# Patient Record
Sex: FEMALE | Race: WHITE | NOT HISPANIC OR LATINO | Employment: OTHER | URBAN - METROPOLITAN AREA
[De-identification: names, ages, dates, MRNs, and addresses within clinical notes are randomized per-mention and may not be internally consistent; named-entity substitution may affect disease eponyms.]

---

## 2017-01-30 RX ORDER — LOSARTAN POTASSIUM 50 MG/1
TABLET ORAL
Qty: 180 TAB | Refills: 1 | Status: SHIPPED | OUTPATIENT
Start: 2017-01-30 | End: 2017-02-28

## 2017-01-30 NOTE — TELEPHONE ENCOUNTER
Refill X 6 months, sent to pharmacy.Pt. Seen in the last 6 months per protocol.   Lab Results   Component Value Date/Time    SODIUM 136 03/31/2016 10:39 AM    POTASSIUM 4.2 03/31/2016 10:39 AM    CHLORIDE 101 03/31/2016 10:39 AM    CO2 27 03/31/2016 10:39 AM    GLUCOSE 99 03/31/2016 10:39 AM    BUN 14 03/31/2016 10:39 AM    CREATININE 0.81 03/31/2016 10:39 AM

## 2017-01-30 NOTE — TELEPHONE ENCOUNTER
Was the patient seen in the last year in this department? Yes     Does patient have an active prescription for medications requested? No     Received Request Via: Pharmacy      Pt met protocol?: Yes, LABS 3/16, OV PCP 2/16, OV OP 6/16 /92

## 2017-02-21 ENCOUNTER — APPOINTMENT (OUTPATIENT)
Dept: MEDICAL GROUP | Facility: PHYSICIAN GROUP | Age: 82
End: 2017-02-21
Payer: MEDICARE

## 2017-02-28 ENCOUNTER — OFFICE VISIT (OUTPATIENT)
Dept: MEDICAL GROUP | Facility: PHYSICIAN GROUP | Age: 82
End: 2017-02-28
Payer: MEDICARE

## 2017-02-28 VITALS
TEMPERATURE: 98.1 F | BODY MASS INDEX: 23.95 KG/M2 | OXYGEN SATURATION: 98 % | WEIGHT: 149 LBS | HEART RATE: 74 BPM | DIASTOLIC BLOOD PRESSURE: 80 MMHG | SYSTOLIC BLOOD PRESSURE: 132 MMHG | HEIGHT: 66 IN | RESPIRATION RATE: 16 BRPM

## 2017-02-28 DIAGNOSIS — E83.52 HYPERCALCEMIA: ICD-10-CM

## 2017-02-28 DIAGNOSIS — Z96.641 S/P HIP REPLACEMENT, RIGHT: ICD-10-CM

## 2017-02-28 DIAGNOSIS — Z23 NEED FOR TDAP VACCINATION: ICD-10-CM

## 2017-02-28 DIAGNOSIS — Z00.00 MEDICARE ANNUAL WELLNESS VISIT, SUBSEQUENT: Primary | ICD-10-CM

## 2017-02-28 DIAGNOSIS — K21.9 GASTROESOPHAGEAL REFLUX DISEASE WITHOUT ESOPHAGITIS: ICD-10-CM

## 2017-02-28 DIAGNOSIS — Z12.39 SCREENING FOR BREAST CANCER: ICD-10-CM

## 2017-02-28 DIAGNOSIS — J30.1 SEASONAL ALLERGIC RHINITIS DUE TO POLLEN: ICD-10-CM

## 2017-02-28 DIAGNOSIS — M19.032 PRIMARY OSTEOARTHRITIS OF LEFT WRIST: ICD-10-CM

## 2017-02-28 DIAGNOSIS — E21.3 HYPERPARATHYROIDISM (HCC): ICD-10-CM

## 2017-02-28 DIAGNOSIS — M50.30 DDD (DEGENERATIVE DISC DISEASE), CERVICAL: ICD-10-CM

## 2017-02-28 DIAGNOSIS — E55.9 VITAMIN D DEFICIENCY: ICD-10-CM

## 2017-02-28 DIAGNOSIS — I10 ESSENTIAL HYPERTENSION: Chronic | ICD-10-CM

## 2017-02-28 DIAGNOSIS — E78.2 MIXED HYPERLIPIDEMIA: ICD-10-CM

## 2017-02-28 DIAGNOSIS — L98.9 CHANGING SKIN LESION: ICD-10-CM

## 2017-02-28 PROCEDURE — G0439 PPPS, SUBSEQ VISIT: HCPCS | Mod: 25 | Performed by: NURSE PRACTITIONER

## 2017-02-28 PROCEDURE — 90715 TDAP VACCINE 7 YRS/> IM: CPT | Performed by: NURSE PRACTITIONER

## 2017-02-28 PROCEDURE — G8510 SCR DEP NEG, NO PLAN REQD: HCPCS | Performed by: NURSE PRACTITIONER

## 2017-02-28 PROCEDURE — 1036F TOBACCO NON-USER: CPT | Performed by: NURSE PRACTITIONER

## 2017-02-28 PROCEDURE — 90471 IMMUNIZATION ADMIN: CPT | Performed by: NURSE PRACTITIONER

## 2017-02-28 PROCEDURE — 99999 PR ANNUAL WELLNESS VISIT-INCLUDES PPPS SUBSEQUE*: CPT | Performed by: NURSE PRACTITIONER

## 2017-02-28 RX ORDER — HYDROCHLOROTHIAZIDE 12.5 MG/1
12.5 CAPSULE, GELATIN COATED ORAL DAILY
Qty: 90 CAP | Refills: 3 | Status: SHIPPED | OUTPATIENT
Start: 2017-02-28 | End: 2018-03-05 | Stop reason: SDUPTHER

## 2017-02-28 RX ORDER — HYDROCHLOROTHIAZIDE 12.5 MG/1
12.5 CAPSULE, GELATIN COATED ORAL DAILY
COMMUNITY
End: 2017-02-28 | Stop reason: SDUPTHER

## 2017-02-28 RX ORDER — LOSARTAN POTASSIUM 100 MG/1
100 TABLET ORAL 2 TIMES DAILY
Qty: 180 TAB | Refills: 3 | Status: SHIPPED | OUTPATIENT
Start: 2017-02-28 | End: 2017-09-15

## 2017-02-28 RX ORDER — ATORVASTATIN CALCIUM 40 MG/1
40 TABLET, FILM COATED ORAL NIGHTLY
COMMUNITY
End: 2017-09-15

## 2017-02-28 ASSESSMENT — PAIN SCALES - GENERAL: PAINLEVEL: NO PAIN

## 2017-02-28 ASSESSMENT — PATIENT HEALTH QUESTIONNAIRE - PHQ9: CLINICAL INTERPRETATION OF PHQ2 SCORE: 0

## 2017-02-28 NOTE — MR AVS SNAPSHOT
"        Zainab Caruso Eri   2017 11:00 AM   Office Visit   MRN: [id number]    Department:  [facility]   Dept Phone:  [phone]    Description:  Female : 7/15/1934   Provider:  JOSEPH Hussein; [facility]            Reason for Visit     Annual Wellness Visit           Allergies as of 2017     Allergen Noted Reactions    Ace Inhibitors 10/19/2015       Statins [Hmg-Coa-R Inhibitors] 2014   Nausea      You were diagnosed with     Medicare annual wellness visit, subsequent   [796345]  -  Primary     Essential hypertension   [8184958]       Hypercalcemia   [275.42.ICD-9-CM]       Hyperparathyroidism (CMS-HCC)   [0251609]       Vitamin D deficiency   [8456674]       Mixed hyperlipidemia   [272.2.ICD-9-CM]       DDD (degenerative disc disease), cervical   [558356]       Primary osteoarthritis of left wrist   [583908]       Seasonal allergic rhinitis due to pollen   [9691445]       Changing skin lesion   [962427]       Gastroesophageal reflux disease without esophagitis   [958903]       S/P hip replacement, right   [2075149]       Need for Tdap vaccination   [929102]       Screening for breast cancer   [366541]         Vital Signs     Blood Pressure Pulse Temperature Respirations Height Weight    132/80 mmHg 74 36.7 °C (98.1 °F) 16 1.676 m (5' 5.98\") 67.586 kg (149 lb)    Body Mass Index Oxygen Saturation Smoking Status             24.06 kg/m2 98% Former Smoker         Basic Information     Date Of Birth Sex Race Ethnicity Preferred Language    7/15/1934 Female White Non- English      Problem List              ICD-10-CM Priority Class Noted - Resolved    Hypertension (Chronic) I10   2014 - Present    Allergic rhinitis J30.9   2014 - Present    GERD (gastroesophageal reflux disease) K21.9   2014 - Present    S/P hip replacement Z96.649   2014 - Present    DDD (degenerative disc disease), cervical M50.30   2014 - Present    Hypercalcemia E83.52  "  1/19/2015 - Present    Hyperparathyroidism (CMS-HCC) E21.3   1/19/2015 - Present    Vitamin D deficiency E55.9   1/19/2015 - Present    Primary osteoarthritis of left wrist M19.032   10/19/2015 - Present      Health Maintenance        Date Due Completion Dates    IMM DTaP/Tdap/Td Vaccine (1 - Tdap) 7/15/1953 ---    MAMMOGRAM 2/11/2017 2/11/2016, 9/9/2009 (Prv Comp)    Override on 9/9/2009: Previously completed    COLONOSCOPY 9/9/2019 9/9/2009 (Prv Comp)    Override on 9/9/2009: Previously completed    BONE DENSITY 3/17/2020 3/17/2015, 9/9/2011 (Prv Comp)    Override on 9/9/2011: Previously completed            Current Immunizations     13-VALENT PCV PREVNAR 10/19/2015    Influenza Vaccine Adult HD 11/20/2016, 10/19/2015    Pneumococcal polysaccharide vaccine (PPSV-23) 10/19/2006    SHINGLES VACCINE 10/1/2015    Tdap Vaccine 2/28/2017      Below and/or attached are the medications your provider expects you to take. Review all of your home medications and newly ordered medications with your provider and/or pharmacist. Follow medication instructions as directed by your provider and/or pharmacist. Please keep your medication list with you and share with your provider. Update the information when medications are discontinued, doses are changed, or new medications (including over-the-counter products) are added; and carry medication information at all times in the event of emergency situations     Allergies:  ACE INHIBITORS - (reactions not documented)     STATINS - Nausea               Medications  Valid as of: February 28, 2017 - 11:59 AM    Generic Name Brand Name Tablet Size Instructions for use    Atorvastatin Calcium (Tab) LIPITOR 40 MG Take 40 mg by mouth every evening.        Cholecalciferol (Tab) cholecalciferol 1000 UNIT Take 1,000 Units by mouth 2 Times a Day.        HydroCHLOROthiazide (Cap) MICROZIDE 12.5 MG Take 1 Cap by mouth every day.        Losartan Potassium (Tab) COZAAR 100 MG Take 1 Tab by mouth 2  Times a Day.        Multiple Vitamin   Take  by mouth.        Omega-3 Fatty Acids (Cap) fish oil 1000 MG Take 1,000 mg by mouth 3 times a day, with meals.        .                 Medicines prescribed today were sent to:     [facility][id number] - [place], [place] - [address]    [address] [facility] [place] [address]    Phone: [phone] Fax: [phone]    Open 24 Hours?: No      Medication refill instructions:       If your prescription bottle indicates you have medication refills left, it is not necessary to call your provider’s office. Please contact your pharmacy and they will refill your medication.    If your prescription bottle indicates you do not have any refills left, you may request refills at any time through one of the following ways: The online [employer] system (except Urgent Care), by calling your provider’s office, or by asking your pharmacy to contact your provider’s office with a refill request. Medication refills are processed only during regular business hours and may not be available until the next business day. Your provider may request additional information or to have a follow-up visit with you prior to refilling your medication.   *Please Note: Medication refills are assigned a new Rx number when refilled electronically. Your pharmacy may indicate that no refills were authorized even though a new prescription for the same medication is available at the pharmacy. Please request the medicine by name with the pharmacy before contacting your provider for a refill.        Your To Do List     Future Labs/Procedures Complete By Expires    COMP METABOLIC PANEL  As directed 2/28/2018    LIPID PROFILE  As directed 2/28/2018    MA-SCREEN MAMMO W/CAD-BILAT  As directed 3/31/2018    VITAMIN D,25 HYDROXY  As directed 2/28/2018      Referral     A referral request has been sent to our patient care coordination department. Please allow 3-5 business days for us to process this request and contact you either by  phone or mail. If you do not hear from us by the 5th business day, please call us at [phone].           MyChart Status: Patient Declined

## 2017-02-28 NOTE — PROGRESS NOTES
Chief Complaint   Patient presents with   • Annual Wellness Visit       HPI:  Zainab is a 82 y.o. female here for Medicare Annual Wellness Visit      Patient Active Problem List    Diagnosis Date Noted   • Primary osteoarthritis of left wrist 10/19/2015   • Hypercalcemia 01/19/2015   • Hyperparathyroidism (CMS-HCC) 01/19/2015   • Vitamin D deficiency 01/19/2015   • DDD (degenerative disc disease), cervical 12/02/2014   • Hypertension 09/09/2014   • Allergic rhinitis 09/09/2014   • GERD (gastroesophageal reflux disease) 09/09/2014   • S/P hip replacement 09/09/2014       Current Outpatient Prescriptions   Medication Sig Dispense Refill   • atorvastatin (LIPITOR) 40 MG Tab Take 40 mg by mouth every evening.     • hydrochlorothiazide (MICROZIDE) 12.5 MG capsule Take 12.5 mg by mouth every day.     • losartan (COZAAR) 50 MG Tab TAKE ONE TABLET BY MOUTH TWICE DAILY 180 Tab 1   • Multiple Vitamin (MULTI-VITAMINS PO) Take  by mouth.     • Omega-3 Fatty Acids (FISH OIL) 1000 MG Cap capsule Take 1,000 mg by mouth 3 times a day, with meals.     • cefdinir (OMNICEF) 300 MG Cap Take 1 Cap by mouth 2 times a day. (Patient not taking: Reported on 2/28/2017) 20 Cap 0   • tramadol (ULTRAM) 50 MG Tab Take 1-2 Tabs by mouth every 8 hours as needed for Mild Pain. 60 Tab 0   • vitamin D (CHOLECALCIFEROL) 1000 UNIT TABS Take 1,000 Units by mouth 2 Times a Day.       No current facility-administered medications for this visit.        The patient reports adherence to this regimen   Current supplements as per medication list.   Chronic narcotic pain medicines: no    Allergies: Ace inhibitors and Statins    Current social contact/activities: out to meals with family, travel to see family in [place]     Is patient current with immunizations?  no   If no, due for Tdap    She  reports that she has quit smoking. She has never used smokeless tobacco. She reports that she drinks about 1.5 oz of alcohol per week. She reports that she does not  use illicit drugs.  Counseling given: Yes      DPA/Advanced Directive:  Patient do not have an advanced directive.  If not on file, instructed to bring in a copy to scan into her chart. If no advanced directive exists, a packet and workshop information was provided    ROS:    Gait: Uses no assistive device   Ostomy: no   Other tubes: no   Amputations: no   Chronic oxygen use no   Last eye exam 2013   :  incontinence.     Depression Screening    Little interest or pleasure in doing things?  0 - not at all  Feeling down, depressed, or hopeless?  0 - not at all  Patient Health Questionnaire Score: 0    No depressive symptoms identified.    Screening for Cognitive Impairment    Three Minute Recall (banana, sunrise, fence)  3/3    Draw clock face with all 12 numbers set to the hand to show 10 minutes past 11 o'clock  5/5    No cognitive concerns identified.    Fall Risk Assessment    Has the patient had two or more falls in the last year or any fall with injury in the last year?  No    Safety Assessment    Throw rugs on floor.  Yes  Handrails on all stairs.  Yes  Good lighting in all hallways.  Yes  Difficulty hearing.  No  Patient counseled about all safety risks that were identified.    Functional Assessment ADLs    Are there any barriers preventing you from cooking for yourself or meeting nutritional needs?  No.    Are there any barriers preventing you from driving safely or obtaining transportation?  No.    Are there any barriers preventing you from using a telephone or calling for help?  No.    Are there any barriers preventing you from shopping?  No.    Are there any barriers preventing you from taking care of your own finances?  No.    Are there any barriers preventing you from managing your medications?  No.    Are currently engaging any exercise or physical activity?   Yes.  Member at [employer]    Health Maintenance Summary                IMM DTaP/Tdap/Td Vaccine Overdue 7/15/1953     MAMMOGRAM Overdue  "2/11/2017      Done 2/11/2016 MA-SCREEN MAMMO W/CAD-BILAT     Patient has more history with this topic...    Annual Wellness Visit Overdue 2/16/2017      Done 2/16/2016 Visit Dx: Medicare annual wellness visit, subsequent    COLONOSCOPY Next Due 9/9/2019      Previously completed 9/9/2009     BONE DENSITY Next Due 3/17/2020      Done 3/17/2015 DS-BONE DENSITY STUDY (DEXA)     Patient has more history with this topic...          Patient Care Team:  JOSEPH Hussein as PCP - General (Family Medicine)  Rayshawn Benson M.D. as Consulting Physician (Endocrinology)    Social History   Substance Use Topics   • Smoking status: Former Smoker   • Smokeless tobacco: Never Used      Comment: quit 30 years ago   • Alcohol Use: 1.5 oz/week     3 Glasses of wine per week     Family History   Problem Relation Age of Onset   • Stroke Mother    • Cancer Father      prostate     She  has a past medical history of Hypertension (9/9/2014); Allergic rhinitis (9/9/2014); GERD (gastroesophageal reflux disease) (9/9/2014); S/P subtotal gastrectomy (remote); S/P cholecystectomy (remote); and Hip fracture (CMS-HCC) (2013).   Past Surgical History   Procedure Laterality Date   • Other       Endoscopy /gastric resection and Vagotomy more than 30 yr ago   • Other       basal cell   • Cholecystectomy     • Other       rib   • Athroplasty       Right hip replacement   • Tubal coagulation laparoscopic bilateral         Exam:     Blood pressure 132/80, pulse 74, temperature 36.7 °C (98.1 °F), resp. rate 16, height 1.676 m (5' 5.98\"), weight 67.586 kg (149 lb), SpO2 98 %. Body mass index is 24.06 kg/(m^2).    Hearing good.    Dentition good  Alert, oriented in no acute distress.  Eye contact is good, speech goal directed, affect calm    Assessment and Plan. The following treatment and monitoring plan is recommended:      1. Medicare annual wellness visit, subsequent  Stable with current regimen.  Appropriate medications and lab monitored " routinely.    - atorvastatin (LIPITOR) 40 MG Tab; Take 40 mg by mouth every evening.  - TDAP VACCINE =>8YO IM  - MA-SCREEN MAMMO W/CAD-BILAT; Standing  - MA-SCREEN MAMMO W/CAD-BILAT; Future  - MA-SCREEN MAMMO W/CAD-BILAT  - LIPID PROFILE; Future  - COMP METABOLIC PANEL; Future  - VITAMIN D,25 HYDROXY; Future  - PTH INTACT  - REFERRAL TO DERMATOLOGY  - losartan (COZAAR) 100 MG Tab; Take 1 Tab by mouth 2 Times a Day.  Dispense: 180 Tab; Refill: 3  - hydrochlorothiazide (MICROZIDE) 12.5 MG capsule; Take 1 Cap by mouth every day.  Dispense: 90 Cap; Refill: 3  - Annual Wellness Visit - Includes PPPS Subsequent ()    2. Essential hypertension  Stable with current regimen.  Appropriate medications and lab monitored routinely.    - atorvastatin (LIPITOR) 40 MG Tab; Take 40 mg by mouth every evening.  - TDAP VACCINE =>8YO IM  - MA-SCREEN MAMMO W/CAD-BILAT; Standing  - MA-SCREEN MAMMO W/CAD-BILAT; Future  - MA-SCREEN MAMMO W/CAD-BILAT  - LIPID PROFILE; Future  - COMP METABOLIC PANEL; Future  - VITAMIN D,25 HYDROXY; Future  - PTH INTACT  - REFERRAL TO DERMATOLOGY  - losartan (COZAAR) 100 MG Tab; Take 1 Tab by mouth 2 Times a Day.  Dispense: 180 Tab; Refill: 3  - hydrochlorothiazide (MICROZIDE) 12.5 MG capsule; Take 1 Cap by mouth every day.  Dispense: 90 Cap; Refill: 3  - Annual Wellness Visit - Includes PPPS Subsequent ()    3. Hypercalcemia  Stable with current regimen.  Appropriate medications and lab monitored routinely.  Followed by endocrinology    - atorvastatin (LIPITOR) 40 MG Tab; Take 40 mg by mouth every evening.  - TDAP VACCINE =>8YO IM  - MA-SCREEN MAMMO W/CAD-BILAT; Standing  - MA-SCREEN MAMMO W/CAD-BILAT; Future  - MA-SCREEN MAMMO W/CAD-BILAT  - LIPID PROFILE; Future  - COMP METABOLIC PANEL; Future  - VITAMIN D,25 HYDROXY; Future  - PTH INTACT  - REFERRAL TO DERMATOLOGY  - losartan (COZAAR) 100 MG Tab; Take 1 Tab by mouth 2 Times a Day.  Dispense: 180 Tab; Refill: 3  - hydrochlorothiazide (MICROZIDE)  12.5 MG capsule; Take 1 Cap by mouth every day.  Dispense: 90 Cap; Refill: 3  - Annual Wellness Visit - Includes PPPS Subsequent ()    4. Hyperparathyroidism (CMS-HCC)  Stable with current regimen.  Appropriate medications and lab monitored routinely.  Followed by endocrinology    - atorvastatin (LIPITOR) 40 MG Tab; Take 40 mg by mouth every evening.  - TDAP VACCINE =>8YO IM  - MA-SCREEN MAMMO W/CAD-BILAT; Standing  - MA-SCREEN MAMMO W/CAD-BILAT; Future  - MA-SCREEN MAMMO W/CAD-BILAT  - LIPID PROFILE; Future  - COMP METABOLIC PANEL; Future  - VITAMIN D,25 HYDROXY; Future  - PTH INTACT  - REFERRAL TO DERMATOLOGY  - losartan (COZAAR) 100 MG Tab; Take 1 Tab by mouth 2 Times a Day.  Dispense: 180 Tab; Refill: 3  - hydrochlorothiazide (MICROZIDE) 12.5 MG capsule; Take 1 Cap by mouth every day.  Dispense: 90 Cap; Refill: 3  - Annual Wellness Visit - Includes PPPS Subsequent ()    5. Vitamin D deficiency  Stable with current regimen.  Appropriate medications and lab monitored routinely.    - atorvastatin (LIPITOR) 40 MG Tab; Take 40 mg by mouth every evening.  - TDAP VACCINE =>8YO IM  - MA-SCREEN MAMMO W/CAD-BILAT; Standing  - MA-SCREEN MAMMO W/CAD-BILAT; Future  - MA-SCREEN MAMMO W/CAD-BILAT  - LIPID PROFILE; Future  - COMP METABOLIC PANEL; Future  - VITAMIN D,25 HYDROXY; Future  - PTH INTACT  - REFERRAL TO DERMATOLOGY  - losartan (COZAAR) 100 MG Tab; Take 1 Tab by mouth 2 Times a Day.  Dispense: 180 Tab; Refill: 3  - hydrochlorothiazide (MICROZIDE) 12.5 MG capsule; Take 1 Cap by mouth every day.  Dispense: 90 Cap; Refill: 3  - Annual Wellness Visit - Includes PPPS Subsequent ()    6. Mixed hyperlipidemia  Stable with current regimen.  Appropriate medications and lab monitored routinely.  Due for repeat fasting labs    - atorvastatin (LIPITOR) 40 MG Tab; Take 40 mg by mouth every evening.  - TDAP VACCINE =>8YO IM  - MA-SCREEN MAMMO W/CAD-BILAT; Standing  - MA-SCREEN MAMMO W/CAD-BILAT; Future  - MA-SCREEN  MAMMO W/CAD-BILAT  - LIPID PROFILE; Future  - COMP METABOLIC PANEL; Future  - VITAMIN D,25 HYDROXY; Future  - PTH INTACT  - REFERRAL TO DERMATOLOGY  - losartan (COZAAR) 100 MG Tab; Take 1 Tab by mouth 2 Times a Day.  Dispense: 180 Tab; Refill: 3  - hydrochlorothiazide (MICROZIDE) 12.5 MG capsule; Take 1 Cap by mouth every day.  Dispense: 90 Cap; Refill: 3  - Annual Wellness Visit - Includes PPPS Subsequent ()    7. DDD (degenerative disc disease), cervical  Stable with current regimen.  Appropriate medications and lab monitored routinely.    - atorvastatin (LIPITOR) 40 MG Tab; Take 40 mg by mouth every evening.  - TDAP VACCINE =>8YO IM  - MA-SCREEN MAMMO W/CAD-BILAT; Standing  - MA-SCREEN MAMMO W/CAD-BILAT; Future  - MA-SCREEN MAMMO W/CAD-BILAT  - LIPID PROFILE; Future  - COMP METABOLIC PANEL; Future  - VITAMIN D,25 HYDROXY; Future  - PTH INTACT  - REFERRAL TO DERMATOLOGY  - losartan (COZAAR) 100 MG Tab; Take 1 Tab by mouth 2 Times a Day.  Dispense: 180 Tab; Refill: 3  - hydrochlorothiazide (MICROZIDE) 12.5 MG capsule; Take 1 Cap by mouth every day.  Dispense: 90 Cap; Refill: 3  - Annual Wellness Visit - Includes PPPS Subsequent ()    8. Primary osteoarthritis of left wrist  Stable with current regimen.  Appropriate medications and lab monitored routinely.    - atorvastatin (LIPITOR) 40 MG Tab; Take 40 mg by mouth every evening.  - TDAP VACCINE =>8YO IM  - MA-SCREEN MAMMO W/CAD-BILAT; Standing  - MA-SCREEN MAMMO W/CAD-BILAT; Future  - MA-SCREEN MAMMO W/CAD-BILAT  - LIPID PROFILE; Future  - COMP METABOLIC PANEL; Future  - VITAMIN D,25 HYDROXY; Future  - PTH INTACT  - REFERRAL TO DERMATOLOGY  - losartan (COZAAR) 100 MG Tab; Take 1 Tab by mouth 2 Times a Day.  Dispense: 180 Tab; Refill: 3  - hydrochlorothiazide (MICROZIDE) 12.5 MG capsule; Take 1 Cap by mouth every day.  Dispense: 90 Cap; Refill: 3  - Annual Wellness Visit - Includes PPPS Subsequent ()    9. Seasonal allergic rhinitis due to  pollen  Stable with current regimen.  Appropriate medications and lab monitored routinely.    - atorvastatin (LIPITOR) 40 MG Tab; Take 40 mg by mouth every evening.  - TDAP VACCINE =>6YO IM  - MA-SCREEN MAMMO W/CAD-BILAT; Standing  - MA-SCREEN MAMMO W/CAD-BILAT; Future  - MA-SCREEN MAMMO W/CAD-BILAT  - LIPID PROFILE; Future  - COMP METABOLIC PANEL; Future  - VITAMIN D,25 HYDROXY; Future  - PTH INTACT  - REFERRAL TO DERMATOLOGY  - losartan (COZAAR) 100 MG Tab; Take 1 Tab by mouth 2 Times a Day.  Dispense: 180 Tab; Refill: 3  - hydrochlorothiazide (MICROZIDE) 12.5 MG capsule; Take 1 Cap by mouth every day.  Dispense: 90 Cap; Refill: 3  - Annual Wellness Visit - Includes PPPS Subsequent ()    10. Changing skin lesion  Personal history of squamous cell  Referral to derm    - atorvastatin (LIPITOR) 40 MG Tab; Take 40 mg by mouth every evening.  - TDAP VACCINE =>6YO IM  - MA-SCREEN MAMMO W/CAD-BILAT; Standing  - MA-SCREEN MAMMO W/CAD-BILAT; Future  - MA-SCREEN MAMMO W/CAD-BILAT  - LIPID PROFILE; Future  - COMP METABOLIC PANEL; Future  - VITAMIN D,25 HYDROXY; Future  - PTH INTACT  - REFERRAL TO DERMATOLOGY  - losartan (COZAAR) 100 MG Tab; Take 1 Tab by mouth 2 Times a Day.  Dispense: 180 Tab; Refill: 3  - hydrochlorothiazide (MICROZIDE) 12.5 MG capsule; Take 1 Cap by mouth every day.  Dispense: 90 Cap; Refill: 3  - Annual Wellness Visit - Includes PPPS Subsequent ()    11. Gastroesophageal reflux disease without esophagitis  Stable with current regimen.  Appropriate medications and lab monitored routinely.    - atorvastatin (LIPITOR) 40 MG Tab; Take 40 mg by mouth every evening.  - TDAP VACCINE =>6YO IM  - MA-SCREEN MAMMO W/CAD-BILAT; Standing  - MA-SCREEN MAMMO W/CAD-BILAT; Future  - MA-SCREEN MAMMO W/CAD-BILAT  - LIPID PROFILE; Future  - COMP METABOLIC PANEL; Future  - VITAMIN D,25 HYDROXY; Future  - PTH INTACT  - REFERRAL TO DERMATOLOGY  - losartan (COZAAR) 100 MG Tab; Take 1 Tab by mouth 2 Times a Day.   Dispense: 180 Tab; Refill: 3  - hydrochlorothiazide (MICROZIDE) 12.5 MG capsule; Take 1 Cap by mouth every day.  Dispense: 90 Cap; Refill: 3  - Annual Wellness Visit - Includes PPPS Subsequent ()    12. S/P hip replacement, right  Stable with current regimen.  Appropriate medications and lab monitored routinely.    - atorvastatin (LIPITOR) 40 MG Tab; Take 40 mg by mouth every evening.  - TDAP VACCINE =>6YO IM  - MA-SCREEN MAMMO W/CAD-BILAT; Standing  - MA-SCREEN MAMMO W/CAD-BILAT; Future  - MA-SCREEN MAMMO W/CAD-BILAT  - LIPID PROFILE; Future  - COMP METABOLIC PANEL; Future  - VITAMIN D,25 HYDROXY; Future  - PTH INTACT  - REFERRAL TO DERMATOLOGY  - losartan (COZAAR) 100 MG Tab; Take 1 Tab by mouth 2 Times a Day.  Dispense: 180 Tab; Refill: 3  - hydrochlorothiazide (MICROZIDE) 12.5 MG capsule; Take 1 Cap by mouth every day.  Dispense: 90 Cap; Refill: 3  - Annual Wellness Visit - Includes PPPS Subsequent ()    13. Need for Tdap vaccination  I have placed the below orders and discussed them with an approved delegating provider. The MA is performing the below orders under the direction of Dr. Monroy, who have provided verbal consent for supervision.    - atorvastatin (LIPITOR) 40 MG Tab; Take 40 mg by mouth every evening.  - TDAP VACCINE =>6YO IM  - MA-SCREEN MAMMO W/CAD-BILAT; Standing  - MA-SCREEN MAMMO W/CAD-BILAT; Future  - MA-SCREEN MAMMO W/CAD-BILAT  - LIPID PROFILE; Future  - COMP METABOLIC PANEL; Future  - VITAMIN D,25 HYDROXY; Future  - PTH INTACT  - REFERRAL TO DERMATOLOGY  - losartan (COZAAR) 100 MG Tab; Take 1 Tab by mouth 2 Times a Day.  Dispense: 180 Tab; Refill: 3  - hydrochlorothiazide (MICROZIDE) 12.5 MG capsule; Take 1 Cap by mouth every day.  Dispense: 90 Cap; Refill: 3  - Annual Wellness Visit - Includes PPPS Subsequent ()    14. Screening for breast cancer  Order given    - atorvastatin (LIPITOR) 40 MG Tab; Take 40 mg by mouth every evening.  - TDAP VACCINE =>6YO IM  - MA-SCREEN MAMMO  W/CAD-BILAT; Standing  - MA-SCREEN MAMMO W/CAD-BILAT; Future  - MA-SCREEN MAMMO W/CAD-BILAT  - LIPID PROFILE; Future  - COMP METABOLIC PANEL; Future  - VITAMIN D,25 HYDROXY; Future  - PTH INTACT  - REFERRAL TO DERMATOLOGY  - losartan (COZAAR) 100 MG Tab; Take 1 Tab by mouth 2 Times a Day.  Dispense: 180 Tab; Refill: 3  - hydrochlorothiazide (MICROZIDE) 12.5 MG capsule; Take 1 Cap by mouth every day.  Dispense: 90 Cap; Refill: 3  - Annual Wellness Visit - Includes PPPS Subsequent ()    Services needed: No services needed at this time  Health Care Screening recommendations as per orders if indicated.  Referrals offered: PT/OT/Nutrition counseling/Behavioral Health/Smoking cessation as per orders if indicated.    Discussion today about general wellness and lifestyle habits:    · Prevent falls and reduce trip hazards; Cautioned about securing or removing rugs.  · Have a working fire alarm and carbon monoxide detector;   · Engage in regular physical activity and social activities       Follow-up:  RTC yearly, sooner if labs warrant discussion.

## 2017-04-12 ENCOUNTER — HOSPITAL ENCOUNTER (OUTPATIENT)
Dept: LAB | Facility: MEDICAL CENTER | Age: 82
End: 2017-04-12
Attending: NURSE PRACTITIONER
Payer: MEDICARE

## 2017-04-12 DIAGNOSIS — Z96.641 S/P HIP REPLACEMENT, RIGHT: ICD-10-CM

## 2017-04-12 DIAGNOSIS — K21.9 GASTROESOPHAGEAL REFLUX DISEASE WITHOUT ESOPHAGITIS: ICD-10-CM

## 2017-04-12 DIAGNOSIS — I10 ESSENTIAL HYPERTENSION: Chronic | ICD-10-CM

## 2017-04-12 DIAGNOSIS — L98.9 CHANGING SKIN LESION: ICD-10-CM

## 2017-04-12 DIAGNOSIS — E55.9 VITAMIN D DEFICIENCY: ICD-10-CM

## 2017-04-12 DIAGNOSIS — M19.032 PRIMARY OSTEOARTHRITIS OF LEFT WRIST: ICD-10-CM

## 2017-04-12 DIAGNOSIS — Z23 NEED FOR TDAP VACCINATION: ICD-10-CM

## 2017-04-12 DIAGNOSIS — E78.2 MIXED HYPERLIPIDEMIA: ICD-10-CM

## 2017-04-12 DIAGNOSIS — J30.1 SEASONAL ALLERGIC RHINITIS DUE TO POLLEN: ICD-10-CM

## 2017-04-12 DIAGNOSIS — Z12.39 SCREENING FOR BREAST CANCER: ICD-10-CM

## 2017-04-12 DIAGNOSIS — Z00.00 MEDICARE ANNUAL WELLNESS VISIT, SUBSEQUENT: ICD-10-CM

## 2017-04-12 DIAGNOSIS — E83.52 HYPERCALCEMIA: ICD-10-CM

## 2017-04-12 DIAGNOSIS — M50.30 DDD (DEGENERATIVE DISC DISEASE), CERVICAL: ICD-10-CM

## 2017-04-12 DIAGNOSIS — E21.3 HYPERPARATHYROIDISM (HCC): ICD-10-CM

## 2017-04-12 LAB
25(OH)D3 SERPL-MCNC: 37 NG/ML (ref 30–100)
ALBUMIN SERPL BCP-MCNC: 4.4 G/DL (ref 3.2–4.9)
ALBUMIN/GLOB SERPL: 1.6 G/DL
ALP SERPL-CCNC: 58 U/L (ref 30–99)
ALT SERPL-CCNC: 8 U/L (ref 2–50)
ANION GAP SERPL CALC-SCNC: 6 MMOL/L (ref 0–11.9)
AST SERPL-CCNC: 13 U/L (ref 12–45)
BILIRUB SERPL-MCNC: 0.7 MG/DL (ref 0.1–1.5)
BUN SERPL-MCNC: 11 MG/DL (ref 8–22)
CALCIUM SERPL-MCNC: 10.3 MG/DL (ref 8.5–10.5)
CHLORIDE SERPL-SCNC: 95 MMOL/L (ref 96–112)
CHOLEST SERPL-MCNC: 202 MG/DL (ref 100–199)
CO2 SERPL-SCNC: 29 MMOL/L (ref 20–33)
CREAT SERPL-MCNC: 0.89 MG/DL (ref 0.5–1.4)
GFR SERPL CREATININE-BSD FRML MDRD: >60 ML/MIN/1.73 M 2
GLOBULIN SER CALC-MCNC: 2.7 G/DL (ref 1.9–3.5)
GLUCOSE SERPL-MCNC: 92 MG/DL (ref 65–99)
HDLC SERPL-MCNC: 94 MG/DL
LDLC SERPL CALC-MCNC: 95 MG/DL
POTASSIUM SERPL-SCNC: 4 MMOL/L (ref 3.6–5.5)
PROT SERPL-MCNC: 7.1 G/DL (ref 6–8.2)
PTH-INTACT SERPL-MCNC: 62.6 PG/ML (ref 14–72)
SODIUM SERPL-SCNC: 130 MMOL/L (ref 135–145)
TRIGL SERPL-MCNC: 64 MG/DL (ref 0–149)

## 2017-04-12 PROCEDURE — 80053 COMPREHEN METABOLIC PANEL: CPT

## 2017-04-12 PROCEDURE — 80061 LIPID PANEL: CPT

## 2017-04-12 PROCEDURE — 36415 COLL VENOUS BLD VENIPUNCTURE: CPT

## 2017-04-12 PROCEDURE — 83970 ASSAY OF PARATHORMONE: CPT

## 2017-04-12 PROCEDURE — 82306 VITAMIN D 25 HYDROXY: CPT

## 2017-04-19 ENCOUNTER — TELEPHONE (OUTPATIENT)
Dept: MEDICAL GROUP | Facility: PHYSICIAN GROUP | Age: 82
End: 2017-04-19

## 2017-04-19 DIAGNOSIS — E87.1 HYPONATREMIA: ICD-10-CM

## 2017-04-19 NOTE — TELEPHONE ENCOUNTER
----- Message from JOSEPH Hussein sent at 4/19/2017  1:37 PM PDT -----  Labs look good with the exception of low electrolytes (sodium and chloride).  I would like to repeat just that portion of the labs in 1-2 weeks. No fasting needed.  Thank you

## 2017-05-05 ENCOUNTER — HOSPITAL ENCOUNTER (OUTPATIENT)
Dept: LAB | Facility: MEDICAL CENTER | Age: 82
End: 2017-05-05
Attending: NURSE PRACTITIONER
Payer: MEDICARE

## 2017-05-05 DIAGNOSIS — E87.1 HYPONATREMIA: ICD-10-CM

## 2017-05-05 LAB
ANION GAP SERPL CALC-SCNC: 11 MMOL/L (ref 0–11.9)
BUN SERPL-MCNC: 14 MG/DL (ref 8–22)
CALCIUM SERPL-MCNC: 10.2 MG/DL (ref 8.5–10.5)
CHLORIDE SERPL-SCNC: 98 MMOL/L (ref 96–112)
CO2 SERPL-SCNC: 26 MMOL/L (ref 20–33)
CREAT SERPL-MCNC: 0.87 MG/DL (ref 0.5–1.4)
GFR SERPL CREATININE-BSD FRML MDRD: >60 ML/MIN/1.73 M 2
GLUCOSE SERPL-MCNC: 105 MG/DL (ref 65–99)
POTASSIUM SERPL-SCNC: 3.9 MMOL/L (ref 3.6–5.5)
SODIUM SERPL-SCNC: 135 MMOL/L (ref 135–145)

## 2017-05-05 PROCEDURE — 80048 BASIC METABOLIC PNL TOTAL CA: CPT

## 2017-05-05 PROCEDURE — 36415 COLL VENOUS BLD VENIPUNCTURE: CPT

## 2017-05-06 ENCOUNTER — HOSPITAL ENCOUNTER (OUTPATIENT)
Dept: RADIOLOGY | Facility: MEDICAL CENTER | Age: 82
End: 2017-05-06
Attending: NURSE PRACTITIONER
Payer: MEDICARE

## 2017-05-06 DIAGNOSIS — I10 ESSENTIAL HYPERTENSION: Chronic | ICD-10-CM

## 2017-05-06 DIAGNOSIS — E55.9 VITAMIN D DEFICIENCY: ICD-10-CM

## 2017-05-06 DIAGNOSIS — E21.3 HYPERPARATHYROIDISM (HCC): ICD-10-CM

## 2017-05-06 DIAGNOSIS — Z00.00 MEDICARE ANNUAL WELLNESS VISIT, SUBSEQUENT: ICD-10-CM

## 2017-05-06 DIAGNOSIS — K21.9 GASTROESOPHAGEAL REFLUX DISEASE WITHOUT ESOPHAGITIS: ICD-10-CM

## 2017-05-06 DIAGNOSIS — M50.30 DDD (DEGENERATIVE DISC DISEASE), CERVICAL: ICD-10-CM

## 2017-05-06 DIAGNOSIS — Z96.641 S/P HIP REPLACEMENT, RIGHT: ICD-10-CM

## 2017-05-06 DIAGNOSIS — J30.1 SEASONAL ALLERGIC RHINITIS DUE TO POLLEN: ICD-10-CM

## 2017-05-06 DIAGNOSIS — L98.9 CHANGING SKIN LESION: ICD-10-CM

## 2017-05-06 DIAGNOSIS — Z12.39 SCREENING FOR BREAST CANCER: ICD-10-CM

## 2017-05-06 DIAGNOSIS — E78.2 MIXED HYPERLIPIDEMIA: ICD-10-CM

## 2017-05-06 DIAGNOSIS — E83.52 HYPERCALCEMIA: ICD-10-CM

## 2017-05-06 DIAGNOSIS — M19.032 PRIMARY OSTEOARTHRITIS OF LEFT WRIST: ICD-10-CM

## 2017-05-06 DIAGNOSIS — Z23 NEED FOR TDAP VACCINATION: ICD-10-CM

## 2017-05-06 PROCEDURE — 77063 BREAST TOMOSYNTHESIS BI: CPT

## 2017-05-08 ENCOUNTER — TELEPHONE (OUTPATIENT)
Dept: MEDICAL GROUP | Facility: PHYSICIAN GROUP | Age: 82
End: 2017-05-08

## 2017-05-08 NOTE — TELEPHONE ENCOUNTER
----- Message from JOSEPH Hussein sent at 5/7/2017  3:24 PM PDT -----  Sodium level has returned to normal.  No additional testing at this time.  Thank you

## 2017-05-08 NOTE — TELEPHONE ENCOUNTER
----- Message from JOSEPH Hussein sent at 5/7/2017  2:54 PM PDT -----  Hello,  I have received your mammogram report and wanted to let you know that it is normal. It is recommended that you follow up in 1-2 years.  Thank you,  Leny

## 2017-09-15 ENCOUNTER — OFFICE VISIT (OUTPATIENT)
Dept: MEDICAL GROUP | Facility: PHYSICIAN GROUP | Age: 82
End: 2017-09-15
Payer: MEDICARE

## 2017-09-15 VITALS
OXYGEN SATURATION: 96 % | HEART RATE: 124 BPM | BODY MASS INDEX: 23.63 KG/M2 | WEIGHT: 147 LBS | RESPIRATION RATE: 16 BRPM | TEMPERATURE: 98.1 F | DIASTOLIC BLOOD PRESSURE: 98 MMHG | SYSTOLIC BLOOD PRESSURE: 150 MMHG | HEIGHT: 66 IN

## 2017-09-15 DIAGNOSIS — I48.91 NEW ONSET ATRIAL FIBRILLATION (HCC): ICD-10-CM

## 2017-09-15 DIAGNOSIS — R09.81 SINUS CONGESTION: ICD-10-CM

## 2017-09-15 PROCEDURE — 99215 OFFICE O/P EST HI 40 MIN: CPT | Performed by: NURSE PRACTITIONER

## 2017-09-15 RX ORDER — AMOXICILLIN AND CLAVULANATE POTASSIUM 875; 125 MG/1; MG/1
1 TABLET, FILM COATED ORAL 2 TIMES DAILY
Qty: 14 TAB | Refills: 0 | Status: SHIPPED
Start: 2017-09-15 | End: 2017-09-19

## 2017-09-15 RX ORDER — AZELASTINE 1 MG/ML
2 SPRAY, METERED NASAL 2 TIMES DAILY
Qty: 30 ML | Refills: 0 | Status: SHIPPED | OUTPATIENT
Start: 2017-09-15 | End: 2017-09-19

## 2017-09-15 RX ORDER — METOPROLOL SUCCINATE 25 MG/1
25 TABLET, EXTENDED RELEASE ORAL DAILY
Qty: 30 TAB | Refills: 0 | Status: SHIPPED | OUTPATIENT
Start: 2017-09-15 | End: 2017-10-12 | Stop reason: SDUPTHER

## 2017-09-15 RX ORDER — FLUTICASONE PROPIONATE 50 MCG
2 SPRAY, SUSPENSION (ML) NASAL DAILY
Qty: 16 G | Refills: 0 | Status: SHIPPED | OUTPATIENT
Start: 2017-09-15 | End: 2018-05-30

## 2017-09-16 NOTE — PROGRESS NOTES
Subjective:     Chief Complaint   Patient presents with   • Cough     ongoing cough for months       HPI  Zainab Hwang is a 83 y.o. female here today for ongoing cough. She is a patient of CAROL Barros. Symptoms started a few months ago.   Coughs, and then sneezes right after coughing. No sore throat, but does feel mucus in throat frequently. Frequently has sinus pain in right side frontal and ethmoidal regions. Has rhinorrhea all the time with clear drainage, sometimes thin, other times thick. Has right ear pain and pressure. Denies any chest congestion, sob, dyspnea, or wheezing. Treatments tried: Benadryl 25 mg twice daily, which does work well. No hx of glaucoma or macular degeneration. Denies any F/C, fatigue, or malaise.       Upon examination today, it was noted that patient had very irregular heart rhythm. EKG confirmed new-onset atrial fibrillation. She denies any history of arrhythmia  New onset atrial fibrillation (CMS-HCC)  New problem. Patient is asymptomatic. States that about one week ago she did have one episode of feeling very funny in which she cannot describe the sensation, but otherwise she denies any chest pain, rapid heart rate, palpitations, near syncope, shortness of breath, or lower extremity edema.         Diagnoses of New onset atrial fibrillation (CMS-HCC) and Sinus congestion were pertinent to this visit.    Allergies: Ace inhibitors and Statins [hmg-coa-r inhibitors]  Current medicines (including changes today)  Current Outpatient Prescriptions   Medication Sig Dispense Refill   • fluticasone (FLONASE) 50 MCG/ACT nasal spray Spray 2 Sprays in nose every day. 16 g 0   • azelastine (ASTELIN) 137 MCG/SPRAY nasal spray [place] 2 Sprays in nose 2 times a day. 30 mL 0   • amoxicillin-clavulanate (AUGMENTIN) 875-125 MG Tab Take 1 Tab by mouth 2 times a day. 14 Tab 0   • metoprolol SR (TOPROL XL) 25 MG TABLET SR 24 HR Take 1 Tab by mouth every day. 30 Tab 0   • hydrochlorothiazide  "(MICROZIDE) 12.5 MG capsule Take 1 Cap by mouth every day. 90 Cap 3   • Omega-3 Fatty Acids (FISH OIL) 1000 MG Cap capsule Take 1,000 mg by mouth 3 times a day, with meals.     • Multiple Vitamin (MULTI-VITAMINS PO) Take  by mouth.     • vitamin D (CHOLECALCIFEROL) 1000 UNIT TABS Take 1,000 Units by mouth 2 Times a Day.       No current facility-administered medications for this visit.        She  has a past medical history of Allergic rhinitis (9/9/2014); GERD (gastroesophageal reflux disease) (9/9/2014); Hip fracture (CMS-[facility]) (2013); Hypertension (9/9/2014); S/P cholecystectomy (remote); and S/P subtotal gastrectomy (remote).      ROS  As stated in HPI and additionally  General: No F/C, fatigue, malaise  Neuro: No headache or dizziness  CV: see HPI   Pulm: see HPI    GI: No abd pain, N/V, diarrhea  : No dysuria or hematuria      Objective:     Blood pressure 150/98, pulse (!) 124, temperature 36.7 °C (98.1 °F), resp. rate 16, height 1.676 m (5' 6\"), weight 66.7 kg (147 lb), SpO2 96 %. Body mass index is 23.73 kg/m².  Physical Exam:  General: Alert, oriented, in no acute distress.  Eye contact is good, speech goal directed, affect calm  CNs grossly intact.  HEENT: conjunctiva non-injected, sclera non-icteric, EOMs intact. PERRL. No lid edema or eye drainage.   Pinna normal without skin lesions. TM pearly alexander. Gross hearing intact.  Nasal turbinates with mild edema and clear drainage.  Oral mucous membranes pink and moist with no lesions. Oropharynx with mild erythema. No exudyate.   TTP over right frontal and maxillary sinus regions   Neck: Supple. No adenopathy or masses in the cervical or supraclavicular regions.  Lungs: unlabored. clear to auscultation bilaterally with good excursion.  CV: tachycardic rate and irregular rhythm. No murmurs. No carotid bruits.   Ext: no edema, normal color and temperature.   Skin: No rashes or lesions in visible areas  Gait steady.     Assessment and Plan: "   Assessment/Plan:  1. New onset atrial fibrillation (CMS-HCC)  New problem. EKG with atrial fibrillation. Check all labs for any contributing factors to this new onset arrhythmia. Educated patient on diagnosis and increased risk for stroke, management of heart rate. She is currently on aspirin 81 mg daily, increase to 162 mg daily. She will establish with cardiologist. In the meantime for rate control, we have started her on metoprolol XL 25 mg daily. I will see patient back in one week and if she has not seen cardiology at this time, we will do chads score and discuss initiation of anticoagulation decision. All questions answered.   - EKG  - CBC WITH DIFFERENTIAL; Future  - COMP METABOLIC PANEL; Future  - MAGNESIUM; Future  - URINALYSIS,CULTURE IF INDICATED; Future  - REFERRAL TO CARDIOLOGY  - TSH; Future    2. Sinus congestion  Ongoing problem not controlled. Suspect possible bacterial component, but we will do a trial of nasal sprays Flonase + Astelin with oral antihistamine for one week. If not improving, start Augmentin one tablet twice daily with food.    The total time spent seeing the patient in consultation, and formulating an action plan for this visit was 45 minutes.  Greater than 50% of this time was spent face to face counseling, discussing problems documented above, coordinating care and answering questions by the provider.        Follow up:  Return in about 4 days (around 9/19/2017) for Short.    Educated in proper administration of medication(s) ordered today including safety, possible SE, risks, benefits, rationale and alternatives to therapy.   Supportive care, differential diagnoses, and indications for immediate follow-up discussed with patient.    Pathogenesis of diagnosis discussed including typical length and natural progression.    Instructed to return to clinic or nearest emergency department for any change in condition, further concerns, or worsening of symptoms.  Patient states  understanding of the plan of care and discharge instructions.      Please note that this dictation was created using voice recognition software. I have made every reasonable attempt to correct obvious errors, but I expect that there are errors of grammar and possibly content that I did not discover before finalizing the note.    Followup: Return in about 4 days (around 9/19/2017) for Short. sooner should new symptoms or problems arise.

## 2017-09-16 NOTE — ASSESSMENT & PLAN NOTE
New problem. Patient is asymptomatic. States that about one week ago she did have one episode of feeling very funny in which she cannot describe the sensation, but otherwise she denies any chest pain, rapid heart rate, palpitations, near syncope, shortness of breath, or lower extremity edema.

## 2017-09-18 ENCOUNTER — HOSPITAL ENCOUNTER (OUTPATIENT)
Dept: LAB | Facility: MEDICAL CENTER | Age: 82
End: 2017-09-18
Attending: NURSE PRACTITIONER
Payer: MEDICARE

## 2017-09-18 DIAGNOSIS — I48.91 NEW ONSET ATRIAL FIBRILLATION (HCC): ICD-10-CM

## 2017-09-18 LAB
ALBUMIN SERPL BCP-MCNC: 4.2 G/DL (ref 3.2–4.9)
ALBUMIN/GLOB SERPL: 1.4 G/DL
ALP SERPL-CCNC: 59 U/L (ref 30–99)
ALT SERPL-CCNC: 13 U/L (ref 2–50)
ANION GAP SERPL CALC-SCNC: 10 MMOL/L (ref 0–11.9)
APPEARANCE UR: ABNORMAL
AST SERPL-CCNC: 16 U/L (ref 12–45)
BACTERIA #/AREA URNS HPF: ABNORMAL /HPF
BASOPHILS # BLD AUTO: 0.5 % (ref 0–1.8)
BASOPHILS # BLD: 0.03 K/UL (ref 0–0.12)
BILIRUB SERPL-MCNC: 0.6 MG/DL (ref 0.1–1.5)
BILIRUB UR QL STRIP.AUTO: NEGATIVE
BUN SERPL-MCNC: 17 MG/DL (ref 8–22)
CALCIUM SERPL-MCNC: 10.3 MG/DL (ref 8.5–10.5)
CHLORIDE SERPL-SCNC: 100 MMOL/L (ref 96–112)
CO2 SERPL-SCNC: 24 MMOL/L (ref 20–33)
COLOR UR: YELLOW
CREAT SERPL-MCNC: 0.94 MG/DL (ref 0.5–1.4)
CULTURE IF INDICATED INDCX: YES UA CULTURE
EOSINOPHIL # BLD AUTO: 0.02 K/UL (ref 0–0.51)
EOSINOPHIL NFR BLD: 0.3 % (ref 0–6.9)
EPI CELLS #/AREA URNS HPF: ABNORMAL /HPF
ERYTHROCYTE [DISTWIDTH] IN BLOOD BY AUTOMATED COUNT: 49.1 FL (ref 35.9–50)
GFR SERPL CREATININE-BSD FRML MDRD: 57 ML/MIN/1.73 M 2
GLOBULIN SER CALC-MCNC: 3 G/DL (ref 1.9–3.5)
GLUCOSE SERPL-MCNC: 107 MG/DL (ref 65–99)
GLUCOSE UR STRIP.AUTO-MCNC: NEGATIVE MG/DL
HCT VFR BLD AUTO: 35.4 % (ref 37–47)
HGB BLD-MCNC: 11.8 G/DL (ref 12–16)
HYALINE CASTS #/AREA URNS LPF: ABNORMAL /LPF
IMM GRANULOCYTES # BLD AUTO: 0.08 K/UL (ref 0–0.11)
IMM GRANULOCYTES NFR BLD AUTO: 1.2 % (ref 0–0.9)
KETONES UR STRIP.AUTO-MCNC: NEGATIVE MG/DL
LEUKOCYTE ESTERASE UR QL STRIP.AUTO: ABNORMAL
LYMPHOCYTES # BLD AUTO: 2.14 K/UL (ref 1–4.8)
LYMPHOCYTES NFR BLD: 32.8 % (ref 22–41)
MAGNESIUM SERPL-MCNC: 1.9 MG/DL (ref 1.5–2.5)
MCH RBC QN AUTO: 31.5 PG (ref 27–33)
MCHC RBC AUTO-ENTMCNC: 33.3 G/DL (ref 33.6–35)
MCV RBC AUTO: 94.4 FL (ref 81.4–97.8)
MICRO URNS: ABNORMAL
MONOCYTES # BLD AUTO: 1.46 K/UL (ref 0–0.85)
MONOCYTES NFR BLD AUTO: 22.4 % (ref 0–13.4)
NEUTROPHILS # BLD AUTO: 2.79 K/UL (ref 2–7.15)
NEUTROPHILS NFR BLD: 42.8 % (ref 44–72)
NITRITE UR QL STRIP.AUTO: NEGATIVE
NRBC # BLD AUTO: 0 K/UL
NRBC BLD AUTO-RTO: 0 /100 WBC
PH UR STRIP.AUTO: 6.5 [PH]
PLATELET # BLD AUTO: 240 K/UL (ref 164–446)
PMV BLD AUTO: 11.1 FL (ref 9–12.9)
POTASSIUM SERPL-SCNC: 4.1 MMOL/L (ref 3.6–5.5)
PROT SERPL-MCNC: 7.2 G/DL (ref 6–8.2)
PROT UR QL STRIP: NEGATIVE MG/DL
RBC # BLD AUTO: 3.75 M/UL (ref 4.2–5.4)
RBC # URNS HPF: ABNORMAL /HPF
RBC UR QL AUTO: NEGATIVE
SODIUM SERPL-SCNC: 134 MMOL/L (ref 135–145)
SP GR UR STRIP.AUTO: 1.02
TSH SERPL DL<=0.005 MIU/L-ACNC: 1.38 UIU/ML (ref 0.3–3.7)
UROBILINOGEN UR STRIP.AUTO-MCNC: 1 MG/DL
WBC # BLD AUTO: 6.5 K/UL (ref 4.8–10.8)
WBC #/AREA URNS HPF: ABNORMAL /HPF

## 2017-09-18 PROCEDURE — 84443 ASSAY THYROID STIM HORMONE: CPT

## 2017-09-18 PROCEDURE — 83735 ASSAY OF MAGNESIUM: CPT | Mod: GA

## 2017-09-18 PROCEDURE — 81001 URINALYSIS AUTO W/SCOPE: CPT

## 2017-09-18 PROCEDURE — 85025 COMPLETE CBC W/AUTO DIFF WBC: CPT

## 2017-09-18 PROCEDURE — 36415 COLL VENOUS BLD VENIPUNCTURE: CPT

## 2017-09-18 PROCEDURE — 87086 URINE CULTURE/COLONY COUNT: CPT

## 2017-09-18 PROCEDURE — 80053 COMPREHEN METABOLIC PANEL: CPT

## 2017-09-19 ENCOUNTER — OFFICE VISIT (OUTPATIENT)
Dept: MEDICAL GROUP | Facility: PHYSICIAN GROUP | Age: 82
End: 2017-09-19
Payer: MEDICARE

## 2017-09-19 VITALS
SYSTOLIC BLOOD PRESSURE: 150 MMHG | OXYGEN SATURATION: 94 % | HEIGHT: 66 IN | TEMPERATURE: 97.6 F | HEART RATE: 125 BPM | RESPIRATION RATE: 16 BRPM | BODY MASS INDEX: 23.63 KG/M2 | DIASTOLIC BLOOD PRESSURE: 84 MMHG | WEIGHT: 147 LBS

## 2017-09-19 DIAGNOSIS — I48.91 NEW ONSET ATRIAL FIBRILLATION (HCC): ICD-10-CM

## 2017-09-19 LAB
BACTERIA UR CULT: NORMAL
SIGNIFICANT IND 70042: NORMAL
SOURCE SOURCE: NORMAL

## 2017-09-19 PROCEDURE — 99213 OFFICE O/P EST LOW 20 MIN: CPT | Performed by: NURSE PRACTITIONER

## 2017-09-19 RX ORDER — AZELASTINE 1 MG/ML
2 SPRAY, METERED NASAL 2 TIMES DAILY
Qty: 30 ML | Refills: 0 | Status: SHIPPED | OUTPATIENT
Start: 2017-09-19 | End: 2018-05-30

## 2017-09-19 NOTE — ASSESSMENT & PLAN NOTE
New problem diagnosed 9/15 by myself d/t abnormal heart rate on exam confirmed with EKG, started on metoprolol SR 25 mg daily, dose taken this morning. No dizziness or lightheadedness.   She continues to not feel palpitations, rapid heart rate, sob, or near-syncope. She is entirely asymptomatic. HR remains 120s despite metoprolol.   She has started aspirin 162 mg daily.   She has appointment with cardiology today.

## 2017-09-22 NOTE — PROGRESS NOTES
Subjective:     Chief Complaint   Patient presents with   • Atrial Fibrillation       HPI  Zainab Hwang is a 83 y.o. female here today for f/u on new A-Fib    New onset atrial fibrillation (CMS-HCC)  New problem diagnosed 9/15 by myself d/t abnormal heart rate on exam confirmed with EKG, started on metoprolol SR 25 mg daily, dose taken this morning. No dizziness or lightheadedness.   She continues to not feel palpitations, rapid heart rate, sob, or near-syncope. She is entirely asymptomatic. HR remains 120s despite metoprolol.   She has started aspirin 162 mg daily.   She has appointment with cardiology today.     All labs for w/u of a-fib essentially normal without any significant levels    Ref. Range 9/18/2017 11:50   WBC Latest Ref Range: 4.8 - 10.8 K/uL 6.5   RBC Latest Ref Range: 4.20 - 5.40 M/uL 3.75 (L)   Hemoglobin Latest Ref Range: 12.0 - 16.0 g/dL 11.8 (L)   Hematocrit Latest Ref Range: 37.0 - 47.0 % 35.4 (L)   MCV Latest Ref Range: 81.4 - 97.8 fL 94.4   MCH Latest Ref Range: 27.0 - 33.0 pg 31.5   MCHC Latest Ref Range: 33.6 - 35.0 g/dL 33.3 (L)   RDW Latest Ref Range: 35.9 - 50.0 fL 49.1   Platelet Count Latest Ref Range: 164 - 446 K/uL 240   MPV Latest Ref Range: 9.0 - 12.9 fL 11.1   Neutrophils-Polys Latest Ref Range: 44.00 - 72.00 % 42.80 (L)   Neutrophils (Absolute) Latest Ref Range: 2.00 - 7.15 K/uL 2.79   Lymphocytes Latest Ref Range: 22.00 - 41.00 % 32.80   Lymphs (Absolute) Latest Ref Range: 1.00 - 4.80 K/uL 2.14   Monocytes Latest Ref Range: 0.00 - 13.40 % 22.40 (H)   Monos (Absolute) Latest Ref Range: 0.00 - 0.85 K/uL 1.46 (H)   Eosinophils Latest Ref Range: 0.00 - 6.90 % 0.30   Eos (Absolute) Latest Ref Range: 0.00 - 0.51 K/uL 0.02   Basophils Latest Ref Range: 0.00 - 1.80 % 0.50   Baso (Absolute) Latest Ref Range: 0.00 - 0.12 K/uL 0.03   Immature Granulocytes Latest Ref Range: 0.00 - 0.90 % 1.20 (H)   Immature Granulocytes (abs) Latest Ref Range: 0.00 - 0.11 K/uL 0.08   Nucleated RBC  Latest Units: /100 WBC 0.00   NRBC (Absolute) Latest Units: K/uL 0.00   Sodium Latest Ref Range: 135 - 145 mmol/L 134 (L)   Potassium Latest Ref Range: 3.6 - 5.5 mmol/L 4.1   Chloride Latest Ref Range: 96 - 112 mmol/L 100   Co2 Latest Ref Range: 20 - 33 mmol/L 24   Anion Gap Latest Ref Range: 0.0 - 11.9  10.0   Glucose Latest Ref Range: 65 - 99 mg/dL 107 (H)   Bun Latest Ref Range: 8 - 22 mg/dL 17   Creatinine Latest Ref Range: 0.50 - 1.40 mg/dL 0.94   GFR If  Latest Ref Range: >60 mL/min/1.73 m 2 >60   GFR If Non  Latest Ref Range: >60 mL/min/1.73 m 2 57 (A)   Calcium Latest Ref Range: 8.5 - 10.5 mg/dL 10.3   AST(SGOT) Latest Ref Range: 12 - 45 U/L 16   ALT(SGPT) Latest Ref Range: 2 - 50 U/L 13   Alkaline Phosphatase Latest Ref Range: 30 - 99 U/L 59   Total Bilirubin Latest Ref Range: 0.1 - 1.5 mg/dL 0.6   Albumin Latest Ref Range: 3.2 - 4.9 g/dL 4.2   Total Protein Latest Ref Range: 6.0 - 8.2 g/dL 7.2   Globulin Latest Ref Range: 1.9 - 3.5 g/dL 3.0   A-G Ratio Latest Units: g/dL 1.4   Magnesium Latest Ref Range: 1.5 - 2.5 mg/dL 1.9   TSH Latest Ref Range: 0.300 - 3.700 uIU/mL 1.380       The encounter diagnosis was New onset atrial fibrillation (CMS-HCC).    Allergies: Ace inhibitors and Statins [hmg-coa-r inhibitors]  Current medicines (including changes today)  Current Outpatient Prescriptions   Medication Sig Dispense Refill   • azelastine (ASTELIN) 137 MCG/SPRAY nasal spray [place] 2 Sprays in nose 2 times a day. 30 mL 0   • fluticasone (FLONASE) 50 MCG/ACT nasal spray Spray 2 Sprays in nose every day. 16 g 0   • metoprolol SR (TOPROL XL) 25 MG TABLET SR 24 HR Take 1 Tab by mouth every day. 30 Tab 0   • hydrochlorothiazide (MICROZIDE) 12.5 MG capsule Take 1 Cap by mouth every day. 90 Cap 3   • Omega-3 Fatty Acids (FISH OIL) 1000 MG Cap capsule Take 1,000 mg by mouth 3 times a day, with meals.     • Multiple Vitamin (MULTI-VITAMINS PO) Take  by mouth.     • vitamin D  "(CHOLECALCIFEROL) 1000 UNIT TABS Take 1,000 Units by mouth 2 Times a Day.       No current facility-administered medications for this visit.        She  has a past medical history of Allergic rhinitis (9/9/2014); GERD (gastroesophageal reflux disease) (9/9/2014); Hip fracture (CMS-HCC) (2013); Hypertension (9/9/2014); S/P cholecystectomy (remote); and S/P subtotal gastrectomy (remote).      ROS  As stated in HPI     Objective:     Blood pressure 150/84, pulse (!) 125, temperature 36.4 °C (97.6 °F), resp. rate 16, height 1.676 m (5' 6\"), weight 66.7 kg (147 lb), SpO2 94 %. Body mass index is 23.73 kg/m².  Physical Exam:  General: Alert, oriented, in no acute distress.  Eye contact is good, speech goal directed, affect calm  CNs grossly intact.  HEENT: conjunctiva non-injected, sclera non-icteric, EOMs intact.   Gross hearing intact.  Lungs: unlabored. clear to auscultation bilaterally with good excursion.  CV: tachycardic rate and irregular rhythm. No murmurs  Ext: no edema, normal color and temperature.   Skin: No rashes or lesions in visible areas  Gait steady.     Assessment and Plan:   Assessment/Plan:  1. New onset atrial fibrillation (CMS-[facility])  New problem. Stable and asymptomatic. Rate not controlled. Continue metoprolol, but increase to 50 mg daily. F/u with cardiology today. [id number] score = 4 (mod-high) risk. Discussed anticoagulation options with her. She will discuss this with cardiology today and make decision pending cardiology eval     The total time spent seeing the patient in consultation, and formulating an action plan for this visit was 15 minutes.  Greater than 50% of this time was spent face to face counseling, discussing problems documented above, coordinating care and answering questions by the provider.        Follow up:  Return in about 4 weeks (around 10/17/2017), or to establish care.    Educated in proper administration of medication(s) ordered today including safety, possible SE, " risks, benefits, rationale and alternatives to therapy.   Supportive care, differential diagnoses, and indications for immediate follow-up discussed with patient.    Pathogenesis of diagnosis discussed including typical length and natural progression.    Instructed to return to clinic or nearest emergency department for any change in condition, further concerns, or worsening of symptoms.  Patient states understanding of the plan of care and discharge instructions.      Please note that this dictation was created using voice recognition software. I have made every reasonable attempt to correct obvious errors, but I expect that there are errors of grammar and possibly content that I did not discover before finalizing the note.    Followup: Return in about 4 weeks (around 10/17/2017), or to establish care. sooner should new symptoms or problems arise.

## 2017-10-12 RX ORDER — METOPROLOL SUCCINATE 25 MG/1
TABLET, EXTENDED RELEASE ORAL
Qty: 90 TAB | Refills: 1 | Status: SHIPPED | OUTPATIENT
Start: 2017-10-12 | End: 2017-10-24

## 2017-10-24 ENCOUNTER — HOSPITAL ENCOUNTER (OUTPATIENT)
Dept: LAB | Facility: MEDICAL CENTER | Age: 82
End: 2017-10-24
Attending: INTERNAL MEDICINE
Payer: MEDICARE

## 2017-10-24 ENCOUNTER — OFFICE VISIT (OUTPATIENT)
Dept: MEDICAL GROUP | Facility: PHYSICIAN GROUP | Age: 82
End: 2017-10-24
Payer: MEDICARE

## 2017-10-24 VITALS
DIASTOLIC BLOOD PRESSURE: 72 MMHG | OXYGEN SATURATION: 98 % | HEART RATE: 91 BPM | WEIGHT: 148 LBS | BODY MASS INDEX: 23.78 KG/M2 | TEMPERATURE: 96.3 F | SYSTOLIC BLOOD PRESSURE: 124 MMHG | RESPIRATION RATE: 12 BRPM | HEIGHT: 66 IN

## 2017-10-24 DIAGNOSIS — E21.3 HYPERPARATHYROIDISM (HCC): ICD-10-CM

## 2017-10-24 DIAGNOSIS — D64.9 NORMOCYTIC ANEMIA: ICD-10-CM

## 2017-10-24 DIAGNOSIS — Z76.89 ENCOUNTER TO ESTABLISH CARE: ICD-10-CM

## 2017-10-24 DIAGNOSIS — J30.89 ALLERGIC RHINITIS DUE TO OTHER ALLERGIC TRIGGER, UNSPECIFIED CHRONICITY, UNSPECIFIED SEASONALITY: ICD-10-CM

## 2017-10-24 DIAGNOSIS — I48.19 PERSISTENT ATRIAL FIBRILLATION (HCC): ICD-10-CM

## 2017-10-24 DIAGNOSIS — E55.9 VITAMIN D DEFICIENCY: ICD-10-CM

## 2017-10-24 DIAGNOSIS — I10 ESSENTIAL HYPERTENSION: Chronic | ICD-10-CM

## 2017-10-24 PROCEDURE — 36415 COLL VENOUS BLD VENIPUNCTURE: CPT

## 2017-10-24 PROCEDURE — 80048 BASIC METABOLIC PNL TOTAL CA: CPT

## 2017-10-24 PROCEDURE — 80061 LIPID PANEL: CPT

## 2017-10-24 PROCEDURE — 99214 OFFICE O/P EST MOD 30 MIN: CPT | Performed by: NURSE PRACTITIONER

## 2017-10-24 RX ORDER — FEXOFENADINE HCL 180 MG/1
180 TABLET ORAL DAILY
Qty: 30 TAB | Refills: 0 | Status: SHIPPED | OUTPATIENT
Start: 2017-10-24 | End: 2017-11-07 | Stop reason: SDUPTHER

## 2017-10-24 RX ORDER — RIVAROXABAN 20 MG/1
TABLET, FILM COATED ORAL
Refills: 6 | COMMUNITY
Start: 2017-09-23 | End: 2018-05-30

## 2017-10-24 RX ORDER — HYDROXYZINE HYDROCHLORIDE 25 MG/1
25 TABLET, FILM COATED ORAL
Qty: 30 TAB | Refills: 0 | Status: SHIPPED | OUTPATIENT
Start: 2017-10-24 | End: 2017-11-07 | Stop reason: SDUPTHER

## 2017-10-24 RX ORDER — METOPROLOL SUCCINATE 100 MG/1
TABLET, EXTENDED RELEASE ORAL
Refills: 6 | COMMUNITY
Start: 2017-09-20 | End: 2017-10-24

## 2017-10-24 RX ORDER — METOPROLOL TARTRATE 100 MG/1
100 TABLET ORAL 2 TIMES DAILY
COMMUNITY
End: 2018-08-23

## 2017-10-24 NOTE — ASSESSMENT & PLAN NOTE
Chronic problem well controlled on current medications (metoprolol and hctz). Does not monitor blood pressure at home. Denies any severe headache, dizziness, vision changes, chest pain, WILSON, palpitations, or lower extremity edema

## 2017-10-24 NOTE — ASSESSMENT & PLAN NOTE
New problem diagnosed 1 month ago, followed by cardiology Dr. Mohsen. Plan for 1 month Xarelto and then cardioversion. Then will transition to flecainide. Currently on metoprolol 100 mg with good rate control. Denies any palpitations, rapid heart rate, sob, syncope

## 2017-10-24 NOTE — ASSESSMENT & PLAN NOTE
New problem.    Ref. Range 9/18/2017 11:50   RBC Latest Ref Range: 4.20 - 5.40 M/uL 3.75 (L)   Hemoglobin Latest Ref Range: 12.0 - 16.0 g/dL 11.8 (L)   Hematocrit Latest Ref Range: 37.0 - 47.0 % 35.4 (L)   MCV Latest Ref Range: 81.4 - 97.8 fL 94.4   MCH Latest Ref Range: 27.0 - 33.0 pg 31.5   MCHC Latest Ref Range: 33.6 - 35.0 g/dL 33.3 (L)

## 2017-10-24 NOTE — ASSESSMENT & PLAN NOTE
Ongoing problem that remains stable on supplementation   Ref. Range 3/31/2016 10:39 4/12/2017 09:11   25-Hydroxy   Vitamin D 25 Latest Ref Range: 30 - 100 ng/mL 39 37

## 2017-10-24 NOTE — ASSESSMENT & PLAN NOTE
Ongoing problem that remains stable. Previously followed Dr. Benson, but has been cleared. Calcium has remained stable   Ref. Range 3/31/2016 10:39 6/25/2016 15:49 4/12/2017 09:11   Pth, Intact Latest Ref Range: 14.0 - 72.0 pg/mL 71.6  62.6

## 2017-10-24 NOTE — PROGRESS NOTES
Chief Complaint   Patient presents with   • Establish Care     Afib, seasonal allergies       HPI:    Zainab Hwang is a 83 y.o. female here to establish care    Allergic rhinitis  Ongoing problem. Not controlled. Currently with excessive clear nasal drainage and cough from mucus in throat. Drainage is clear. Has been using oral antihistamine and Flonase + Astelin for 1 month. Not improving.       Hyperparathyroidism  Ongoing problem that remains stable. Previously followed Dr. Benson, but has been cleared. Calcium has remained stable   Ref. Range 3/31/2016 10:39 6/25/2016 15:49 4/12/2017 09:11   Pth, Intact Latest Ref Range: 14.0 - 72.0 pg/mL 71.6  62.6       Vitamin D deficiency  Ongoing problem that remains stable on supplementation   Ref. Range 3/31/2016 10:39 4/12/2017 09:11   25-Hydroxy   Vitamin D 25 Latest Ref Range: 30 - 100 ng/mL 39 37       Hypertension  Chronic problem well controlled on current medications (metoprolol and hctz). Does not monitor blood pressure at home. Denies any severe headache, dizziness, vision changes, chest pain, WILSON, palpitations, or lower extremity edema      Persistent atrial fibrillation (CMS-HCC)  New problem diagnosed 1 month ago, followed by cardiology Dr. Mohsen. Plan for 1 month Xarelto and then cardioversion. Then will transition to flecainide. Currently on metoprolol 100 mg with good rate control. Denies any palpitations, rapid heart rate, sob, syncope    Normocytic anemia  New problem.    Ref. Range 9/18/2017 11:50   RBC Latest Ref Range: 4.20 - 5.40 M/uL 3.75 (L)   Hemoglobin Latest Ref Range: 12.0 - 16.0 g/dL 11.8 (L)   Hematocrit Latest Ref Range: 37.0 - 47.0 % 35.4 (L)   MCV Latest Ref Range: 81.4 - 97.8 fL 94.4   MCH Latest Ref Range: 27.0 - 33.0 pg 31.5   MCHC Latest Ref Range: 33.6 - 35.0 g/dL 33.3 (L)     Current medicines (including changes today)  Current Outpatient Prescriptions   Medication Sig Dispense Refill   • XARELTO 20 MG Tab tablet   6   •  hydrOXYzine HCl (ATARAX) 25 MG Tab Take 1 Tab by mouth every bedtime. 30 Tab 0   • fexofenadine (ALLEGRA ALLERGY) 180 MG tablet Take 1 Tab by mouth every day. 30 Tab 0   • metoprolol (LOPRESSOR) 100 MG Tab Take 100 mg by mouth 2 times a day.     • azelastine (ASTELIN) 137 MCG/SPRAY nasal spray [place] 2 Sprays in nose 2 times a day. 30 mL 0   • fluticasone (FLONASE) 50 MCG/ACT nasal spray Spray 2 Sprays in nose every day. 16 g 0   • hydrochlorothiazide (MICROZIDE) 12.5 MG capsule Take 1 Cap by mouth every day. 90 Cap 3   • Omega-3 Fatty Acids (FISH OIL) 1000 MG Cap capsule Take 1,000 mg by mouth 3 times a day, with meals.     • Multiple Vitamin (MULTI-VITAMINS PO) Take  by mouth.     • vitamin D (CHOLECALCIFEROL) 1000 UNIT TABS Take 1,000 Units by mouth 2 Times a Day.       No current facility-administered medications for this visit.      She  has a past medical history of Allergic rhinitis (9/9/2014); Basal cell carcinoma; GERD (gastroesophageal reflux disease) (9/9/2014); Hip fracture (CMS-HCC) (2013); Hyperparathyroidism (CMS-HCC); Hypertension (9/9/2014); Recurrent peptic ulcer disease; S/P cholecystectomy (remote); and S/P subtotal gastrectomy (remote). She also has no past medical history of Anxiety; Asthma; or Depression.  She  has a past surgical history that includes other; other; cholecystectomy; other; athroplasty; and tubal coagulation laparoscopic bilateral.  Social History   Substance Use Topics   • Smoking status: Former Smoker     Packs/day: 0.50     Years: 30.00     Types: Cigarettes     Quit date: 10/24/1980   • Smokeless tobacco: Never Used   • Alcohol use 4.2 oz/week     7 Glasses of wine per week     Social History     Social History Narrative   • No narrative on file     Family History   Problem Relation Age of Onset   • Heart Disease Mother    • Cancer Father      prostate   • Lung Cancer Sister    • Lung Cancer Brother    • No Known Problems Sister    • No Known Problems Son    • No Known  "Problems Daughter    • Diabetes Neg Hx      Family Status   Relation Status   • Mother     \"some heart thing\"   • Father    • Sister    • Brother    • Sister Alive   • Son Alive   • Daughter Alive   • Neg Hx      Health Maintenance Topics with due status: Overdue       Topic Date Due    IMM INFLUENZA 2017        ROS  Constitutional: +insomnia. No F/C, night sweats  Head/Neck: No headache, dizziness, or yuki enlargement  Eyes: No redness, discharge  Nose: +clear discharge  Mouth/Throat: No sore throat  Lungs: +cough with clear sputum. No sob, dyspnea  Cardiac: No chest pain, palpitations, syncope or near syncope, WILSON, LE edema        Objective:     Blood pressure 124/72, pulse 91, temperature (!) 35.7 °C (96.3 °F), resp. rate 12, height 1.676 m (5' 6\"), weight 67.1 kg (148 lb), SpO2 98 %. Body mass index is 23.89 kg/m².  Physical Exam:  Alert, oriented in no acute distress.  Eye contact is good, speech goal directed, affect bright.  HEENT: EOMI, conjunctiva non-injected, sclera non-icteric. No lid edema or eye drainage  Nares patent with pale turbinates and clear drainage  Gross hearing intact   Oral mucous membranes pink and moist with no lesions. Oropharynx without erythema or exudate  Neck: supple with no cervical or supraclavicular lymphadenopathy  Lungs: unlabored, clear to auscultation bilaterally with good excursion.  CV: regular rate and irregular rhythm, no murmurs, no carotid bruits  Lower extremities color normal, no edema  Skin: No rashes or lesions in visible areas  Neuro: CNs grossly intact. Gait steady.         Assessment and Plan:   Assessment/Plan:  1. Encounter to establish care    2. Persistent atrial fibrillation (CMS-HCC)  Stable on current meds, continue f/u and management with cardiology   - COMP METABOLIC PANEL; Future    3. Essential hypertension  Stable on current meds. Goal bp <150/90    4. Allergic rhinitis due to other allergic trigger, unspecified " chronicity, unspecified seasonality  Not controlled. Continue nasal sprays, add Allegra 180 mg daily. If not improving in 1-2 weeks, may return for Kenalog inj    5. Hyperparathyroidism (CMS-[facility])  Check labs, monitor  - PTH INTACT (PTH ONLY); Future    6. Vitamin D deficiency  Check labs. monitor  - VITAMIN D,25 HYDROXY; Future    7. Normocytic anemia  Check labs. monitor  - CBC WITH DIFFERENTIAL; Future       Follow up:  Return in about 8 months (around 6/24/2018).    Educated in proper administration of medication(s) ordered today including safety, possible SE, risks, benefits, rationale and alternatives to therapy.   Supportive care, differential diagnoses, and indications for immediate follow-up discussed with patient.    Pathogenesis of diagnosis discussed including typical length and natural progression.    Instructed to return to clinic or nearest emergency department for any change in condition, further concerns, or worsening of symptoms.  Patient states understanding of the plan of care and discharge instructions.    Please note that this dictation was created using voice recognition software. I have made every reasonable attempt to correct obvious errors, but I expect that there are errors of grammar and possibly content that I did not discover before finalizing the note.    Followup: Return in about 8 months (around 6/24/2018). sooner should new symptoms or problems arise.

## 2017-10-25 LAB
ANION GAP SERPL CALC-SCNC: 8 MMOL/L (ref 0–11.9)
BUN SERPL-MCNC: 14 MG/DL (ref 8–22)
CALCIUM SERPL-MCNC: 10.2 MG/DL (ref 8.5–10.5)
CHLORIDE SERPL-SCNC: 91 MMOL/L (ref 96–112)
CHOLEST SERPL-MCNC: 175 MG/DL (ref 100–199)
CO2 SERPL-SCNC: 28 MMOL/L (ref 20–33)
CREAT SERPL-MCNC: 0.86 MG/DL (ref 0.5–1.4)
GFR SERPL CREATININE-BSD FRML MDRD: >60 ML/MIN/1.73 M 2
GLUCOSE SERPL-MCNC: 100 MG/DL (ref 65–99)
HDLC SERPL-MCNC: 66 MG/DL
LDLC SERPL CALC-MCNC: 89 MG/DL
POTASSIUM SERPL-SCNC: 3.9 MMOL/L (ref 3.6–5.5)
SODIUM SERPL-SCNC: 127 MMOL/L (ref 135–145)
TRIGL SERPL-MCNC: 98 MG/DL (ref 0–149)

## 2017-11-07 RX ORDER — FEXOFENADINE HCL 180 MG/1
180 TABLET ORAL DAILY
Qty: 90 TAB | Refills: 3 | Status: SHIPPED | OUTPATIENT
Start: 2017-11-07 | End: 2017-11-17

## 2017-11-07 RX ORDER — HYDROXYZINE HYDROCHLORIDE 25 MG/1
25 TABLET, FILM COATED ORAL
Qty: 90 TAB | Refills: 3 | Status: SHIPPED | OUTPATIENT
Start: 2017-11-07 | End: 2018-05-30

## 2017-11-07 NOTE — TELEPHONE ENCOUNTER
Was the patient seen in the last year in this department? Yes     Does patient have an active prescription for medications requested? Yes    Received Request Via: Pharmacy     PT is wanting 90 supply

## 2017-11-13 ENCOUNTER — HOSPITAL ENCOUNTER (OUTPATIENT)
Dept: LAB | Facility: MEDICAL CENTER | Age: 82
End: 2017-11-13
Attending: INTERNAL MEDICINE
Payer: MEDICARE

## 2017-11-13 LAB
ANION GAP SERPL CALC-SCNC: 7 MMOL/L (ref 0–11.9)
BUN SERPL-MCNC: 14 MG/DL (ref 8–22)
CALCIUM SERPL-MCNC: 10.2 MG/DL (ref 8.5–10.5)
CHLORIDE SERPL-SCNC: 95 MMOL/L (ref 96–112)
CHOLEST SERPL-MCNC: 173 MG/DL (ref 100–199)
CO2 SERPL-SCNC: 28 MMOL/L (ref 20–33)
CREAT SERPL-MCNC: 0.77 MG/DL (ref 0.5–1.4)
GFR SERPL CREATININE-BSD FRML MDRD: >60 ML/MIN/1.73 M 2
GLUCOSE SERPL-MCNC: 104 MG/DL (ref 65–99)
HDLC SERPL-MCNC: 72 MG/DL
LDLC SERPL CALC-MCNC: 86 MG/DL
POTASSIUM SERPL-SCNC: 3.9 MMOL/L (ref 3.6–5.5)
SODIUM SERPL-SCNC: 130 MMOL/L (ref 135–145)
TRIGL SERPL-MCNC: 77 MG/DL (ref 0–149)

## 2017-11-13 PROCEDURE — 80048 BASIC METABOLIC PNL TOTAL CA: CPT

## 2017-11-13 PROCEDURE — 36415 COLL VENOUS BLD VENIPUNCTURE: CPT

## 2017-11-13 PROCEDURE — 80061 LIPID PANEL: CPT

## 2017-11-17 ENCOUNTER — OFFICE VISIT (OUTPATIENT)
Dept: MEDICAL GROUP | Facility: PHYSICIAN GROUP | Age: 82
End: 2017-11-17
Payer: MEDICARE

## 2017-11-17 VITALS
TEMPERATURE: 97.6 F | HEIGHT: 66 IN | RESPIRATION RATE: 12 BRPM | SYSTOLIC BLOOD PRESSURE: 138 MMHG | OXYGEN SATURATION: 95 % | BODY MASS INDEX: 24.11 KG/M2 | DIASTOLIC BLOOD PRESSURE: 80 MMHG | HEART RATE: 67 BPM | WEIGHT: 150 LBS

## 2017-11-17 DIAGNOSIS — D64.9 NORMOCYTIC ANEMIA: ICD-10-CM

## 2017-11-17 DIAGNOSIS — I48.19 PERSISTENT ATRIAL FIBRILLATION (HCC): ICD-10-CM

## 2017-11-17 DIAGNOSIS — Z09 HOSPITAL DISCHARGE FOLLOW-UP: ICD-10-CM

## 2017-11-17 PROCEDURE — 99214 OFFICE O/P EST MOD 30 MIN: CPT | Performed by: NURSE PRACTITIONER

## 2017-11-17 RX ORDER — FLECAINIDE ACETATE 50 MG/1
50 TABLET ORAL 2 TIMES DAILY
COMMUNITY
End: 2018-08-23

## 2017-11-17 NOTE — ASSESSMENT & PLAN NOTE
Ongoing problem followed by cardiology DR. Mohsen. Just had successful cardioversion on 11/15 at [facility]. She was discharged home that day. She denies any further palpitations, rapid heart rate, syncope, chest pain, WILSON, or LE edema. She has f/u tentatively scheduled with cardiology in one month. She was discharged home on flecainide 50 mg BID and metoprolol 100 mg BID. Also discharged on Xarelto.

## 2017-11-17 NOTE — PROGRESS NOTES
Subjective:     Chief Complaint   Patient presents with   • Atrial Fibrillation       HPI  Zainab Hwang is a 83 y.o. female here today for Hospital discharge follow-up. She had a planned visit at [facility] on 11/15 for cardioversion. I had recently diagnosed patient with atrial fibrillation in September and they have been planning this procedure. I have reviewed the hospital records including lab tests and imaging.    Persistent atrial fibrillation (CMS-HCC)  Ongoing problem followed by cardiology DR. Mohsen. Just had successful cardioversion on 11/15 at [facility]. She was discharged home that day. She denies any further palpitations, rapid heart rate, syncope, chest pain, WILSON, or LE edema. She has f/u tentatively scheduled with cardiology in one month. She was discharged home on flecainide 50 mg BID and metoprolol 100 mg BID. Also discharged on Xarelto.     Normocytic anemia  Has history of anemia. During recent hospitalization on 11/15 hgb was 10.9. Patient denies any blood in stool. She reports she has had anemia for a lot of her life.   Ref. Range 9/18/2017 11:50   RBC Latest Ref Range: 4.20 - 5.40 M/uL 3.75 (L)   Hemoglobin Latest Ref Range: 12.0 - 16.0 g/dL 11.8 (L)   Hematocrit Latest Ref Range: 37.0 - 47.0 % 35.4 (L)   MCV Latest Ref Range: 81.4 - 97.8 fL 94.4   MCH Latest Ref Range: 27.0 - 33.0 pg 31.5   MCHC Latest Ref Range: 33.6 - 35.0 g/dL 33.3 (L)   RDW Latest Ref Range: 35.9 - 50.0 fL 49.1   Platelet Count Latest Ref Range: 164 - 446 K/uL 240   MPV Latest Ref Range: 9.0 - 12.9 fL 11.1        Diagnoses of Hospital discharge follow-up, Persistent atrial fibrillation (CMS-HCC), and Normocytic anemia were pertinent to this visit.    Allergies: Ace inhibitors and Statins [hmg-coa-r inhibitors]  Current medicines (including changes today)  Current Outpatient Prescriptions   Medication Sig Dispense Refill   • flecainide (TAMBOCOR) 50 MG tablet Take 50 mg by mouth 2 times a day.     • hydrOXYzine HCl  "(ATARAX) 25 MG Tab Take 1 Tab by mouth every bedtime. 90 Tab 3   • XARELTO 20 MG Tab tablet   6   • metoprolol (LOPRESSOR) 100 MG Tab Take 100 mg by mouth 2 times a day.     • azelastine (ASTELIN) 137 MCG/SPRAY nasal spray [place] 2 Sprays in nose 2 times a day. 30 mL 0   • fluticasone (FLONASE) 50 MCG/ACT nasal spray Spray 2 Sprays in nose every day. 16 g 0   • hydrochlorothiazide (MICROZIDE) 12.5 MG capsule Take 1 Cap by mouth every day. 90 Cap 3   • Multiple Vitamin (MULTI-VITAMINS PO) Take  by mouth.     • vitamin D (CHOLECALCIFEROL) 1000 UNIT TABS Take 1,000 Units by mouth 2 Times a Day.       No current facility-administered medications for this visit.        She  has a past medical history of Allergic rhinitis (9/9/2014); Atrial fibrillation (CMS-HCC) (2017); Basal cell carcinoma; GERD (gastroesophageal reflux disease) (9/9/2014); Hip fracture (CMS-HCC) (2013); Hyperparathyroidism (CMS-HCC); Hypertension (9/9/2014); Recurrent peptic ulcer disease; S/P cholecystectomy (remote); and S/P subtotal gastrectomy (remote). She also has no past medical history of Anxiety; Asthma; or Depression.    Health Maintenance: UTD (will get flu shot in a week)    ROS  As stated in HPI and additionally  General: No fatigue   Neuro: No dizziness or unusual headache  CV: see HPI  Pulm: No sob or dyspnea      Objective:     Blood pressure 138/80, pulse 67, temperature 36.4 °C (97.6 °F), resp. rate 12, height 1.676 m (5' 6\"), weight 68 kg (150 lb), SpO2 95 %. Body mass index is 24.21 kg/m².  Physical Exam:  General: Alert, oriented, in no acute distress.  Eye contact is good, speech goal directed, affect calm  CNs grossly intact.  HEENT: conjunctiva non-injected, sclera non-icteric, EOMs intact. No lid edema or eye drainage.   Gross hearing intact.  Lungs: unlabored. clear to auscultation bilaterally with good excursion.  CV: regular rate and rhythm. Faint systolic murmur. No carotid bruits.   Ext: no edema, normal color and " temperature.   Skin: No rashes or lesions in visible areas  Gait steady.     Assessment and Plan:   Assessment/Plan:  1. Hospital discharge follow-up  Stable. I have reviewed all hospital records including lab results, imaging studies. Educated patient on s/sx to observe for and what action to take when these occur. Reviewed current medications and educated on importance of compliance with these medications all appropriate follow-up visits are scheduled.     2. Persistent atrial fibrillation (CMS-HCC)  Appears resolved. Continue current meds. F/u with cardiology as scheduled. Monitor for returning symptoms. Check labs in 3 months   - BASIC METABOLIC PANEL; Future    3. Normocytic anemia  Stable. Check labs in 3 months   - CBC WITH DIFFERENTIAL; Future       Follow up:  Return in about 6 months (around 5/17/2018).    Educated in proper administration of medication(s) ordered today including safety, possible SE, risks, benefits, rationale and alternatives to therapy.   Supportive care, differential diagnoses, and indications for immediate follow-up discussed with patient.    Pathogenesis of diagnosis discussed including typical length and natural progression.    Instructed to return to clinic or nearest emergency department for any change in condition, further concerns, or worsening of symptoms.  Patient states understanding of the plan of care and discharge instructions.      Please note that this dictation was created using voice recognition software. I have made every reasonable attempt to correct obvious errors, but I expect that there are errors of grammar and possibly content that I did not discover before finalizing the note.    Followup: Return in about 6 months (around 5/17/2018). sooner should new symptoms or problems arise.

## 2017-11-17 NOTE — ASSESSMENT & PLAN NOTE
Has history of anemia. During recent hospitalization on 11/15 hgb was 10.9. Patient denies any blood in stool. She reports she has had anemia for a lot of her life.   Ref. Range 9/18/2017 11:50   RBC Latest Ref Range: 4.20 - 5.40 M/uL 3.75 (L)   Hemoglobin Latest Ref Range: 12.0 - 16.0 g/dL 11.8 (L)   Hematocrit Latest Ref Range: 37.0 - 47.0 % 35.4 (L)   MCV Latest Ref Range: 81.4 - 97.8 fL 94.4   MCH Latest Ref Range: 27.0 - 33.0 pg 31.5   MCHC Latest Ref Range: 33.6 - 35.0 g/dL 33.3 (L)   RDW Latest Ref Range: 35.9 - 50.0 fL 49.1   Platelet Count Latest Ref Range: 164 - 446 K/uL 240   MPV Latest Ref Range: 9.0 - 12.9 fL 11.1

## 2017-11-20 DIAGNOSIS — J30.89 ALLERGIC RHINITIS DUE TO OTHER ALLERGIC TRIGGER, UNSPECIFIED CHRONICITY, UNSPECIFIED SEASONALITY: ICD-10-CM

## 2017-11-20 RX ORDER — FEXOFENADINE HCL 180 MG/1
TABLET ORAL
Qty: 90 TAB | Refills: 3 | Status: SHIPPED | OUTPATIENT
Start: 2017-11-20 | End: 2018-08-23

## 2017-11-21 NOTE — TELEPHONE ENCOUNTER
Was the patient seen in the last year in this department? Yes    Last visit :11/17/2017    Does patient have an active prescription for medications requested? No     Received Request Via: Pharmacy

## 2017-11-30 PROBLEM — I34.0 NON-RHEUMATIC MITRAL REGURGITATION: Status: ACTIVE | Noted: 2017-11-30

## 2018-01-04 ENCOUNTER — HOSPITAL ENCOUNTER (OUTPATIENT)
Dept: LAB | Facility: MEDICAL CENTER | Age: 83
End: 2018-01-04
Attending: INTERNAL MEDICINE
Payer: MEDICARE

## 2018-01-04 LAB
CHOLEST SERPL-MCNC: 185 MG/DL (ref 100–199)
HDLC SERPL-MCNC: 75 MG/DL
LDLC SERPL CALC-MCNC: 97 MG/DL
TRIGL SERPL-MCNC: 64 MG/DL (ref 0–149)

## 2018-01-04 PROCEDURE — 36415 COLL VENOUS BLD VENIPUNCTURE: CPT

## 2018-01-04 PROCEDURE — 80061 LIPID PANEL: CPT

## 2018-03-05 DIAGNOSIS — Z12.39 SCREENING FOR BREAST CANCER: ICD-10-CM

## 2018-03-05 DIAGNOSIS — E83.52 HYPERCALCEMIA: ICD-10-CM

## 2018-03-05 DIAGNOSIS — L98.9 CHANGING SKIN LESION: ICD-10-CM

## 2018-03-05 DIAGNOSIS — M50.30 DDD (DEGENERATIVE DISC DISEASE), CERVICAL: ICD-10-CM

## 2018-03-05 DIAGNOSIS — M19.032 PRIMARY OSTEOARTHRITIS OF LEFT WRIST: ICD-10-CM

## 2018-03-05 DIAGNOSIS — Z96.641 S/P HIP REPLACEMENT, RIGHT: ICD-10-CM

## 2018-03-05 DIAGNOSIS — E21.3 HYPERPARATHYROIDISM (HCC): ICD-10-CM

## 2018-03-05 DIAGNOSIS — K21.9 GASTROESOPHAGEAL REFLUX DISEASE WITHOUT ESOPHAGITIS: ICD-10-CM

## 2018-03-05 DIAGNOSIS — Z00.00 MEDICARE ANNUAL WELLNESS VISIT, SUBSEQUENT: ICD-10-CM

## 2018-03-05 DIAGNOSIS — E55.9 VITAMIN D DEFICIENCY: ICD-10-CM

## 2018-03-05 DIAGNOSIS — Z23 NEED FOR TDAP VACCINATION: ICD-10-CM

## 2018-03-05 DIAGNOSIS — I10 ESSENTIAL HYPERTENSION: Chronic | ICD-10-CM

## 2018-03-05 DIAGNOSIS — E78.2 MIXED HYPERLIPIDEMIA: ICD-10-CM

## 2018-03-05 DIAGNOSIS — J30.1 SEASONAL ALLERGIC RHINITIS DUE TO POLLEN: ICD-10-CM

## 2018-03-06 RX ORDER — HYDROCHLOROTHIAZIDE 12.5 MG/1
12.5 CAPSULE, GELATIN COATED ORAL DAILY
Qty: 90 CAP | Refills: 3 | Status: SHIPPED | OUTPATIENT
Start: 2018-03-06 | End: 2018-05-30

## 2018-03-28 ENCOUNTER — HOSPITAL ENCOUNTER (OUTPATIENT)
Dept: LAB | Facility: MEDICAL CENTER | Age: 83
End: 2018-03-28
Attending: INTERNAL MEDICINE
Payer: MEDICARE

## 2018-03-28 LAB
ANION GAP SERPL CALC-SCNC: 10 MMOL/L (ref 0–11.9)
BASOPHILS # BLD AUTO: 0.3 % (ref 0–1.8)
BASOPHILS # BLD: 0.03 K/UL (ref 0–0.12)
BUN SERPL-MCNC: 26 MG/DL (ref 8–22)
CALCIUM SERPL-MCNC: 9.8 MG/DL (ref 8.5–10.5)
CHLORIDE SERPL-SCNC: 98 MMOL/L (ref 96–112)
CO2 SERPL-SCNC: 25 MMOL/L (ref 20–33)
CREAT SERPL-MCNC: 1.01 MG/DL (ref 0.5–1.4)
EOSINOPHIL # BLD AUTO: 0.05 K/UL (ref 0–0.51)
EOSINOPHIL NFR BLD: 0.5 % (ref 0–6.9)
ERYTHROCYTE [DISTWIDTH] IN BLOOD BY AUTOMATED COUNT: 51.8 FL (ref 35.9–50)
GLUCOSE SERPL-MCNC: 102 MG/DL (ref 65–99)
HCT VFR BLD AUTO: 24.5 % (ref 37–47)
HGB BLD-MCNC: 7.4 G/DL (ref 12–16)
IMM GRANULOCYTES # BLD AUTO: 0.25 K/UL (ref 0–0.11)
IMM GRANULOCYTES NFR BLD AUTO: 2.5 % (ref 0–0.9)
INR PPP: 1.65 (ref 0.87–1.13)
LYMPHOCYTES # BLD AUTO: 2.32 K/UL (ref 1–4.8)
LYMPHOCYTES NFR BLD: 23.4 % (ref 22–41)
MCH RBC QN AUTO: 27.6 PG (ref 27–33)
MCHC RBC AUTO-ENTMCNC: 30.2 G/DL (ref 33.6–35)
MCV RBC AUTO: 91.4 FL (ref 81.4–97.8)
MONOCYTES # BLD AUTO: 2.14 K/UL (ref 0–0.85)
MONOCYTES NFR BLD AUTO: 21.6 % (ref 0–13.4)
NEUTROPHILS # BLD AUTO: 5.12 K/UL (ref 2–7.15)
NEUTROPHILS NFR BLD: 51.7 % (ref 44–72)
NRBC # BLD AUTO: 0 K/UL
NRBC BLD-RTO: 0 /100 WBC
PLATELET # BLD AUTO: 300 K/UL (ref 164–446)
PMV BLD AUTO: 11.9 FL (ref 9–12.9)
POTASSIUM SERPL-SCNC: 4 MMOL/L (ref 3.6–5.5)
PROTHROMBIN TIME: 19.2 SEC (ref 12–14.6)
RBC # BLD AUTO: 2.68 M/UL (ref 4.2–5.4)
SODIUM SERPL-SCNC: 133 MMOL/L (ref 135–145)
WBC # BLD AUTO: 9.9 K/UL (ref 4.8–10.8)

## 2018-03-28 PROCEDURE — 80048 BASIC METABOLIC PNL TOTAL CA: CPT

## 2018-03-28 PROCEDURE — 36415 COLL VENOUS BLD VENIPUNCTURE: CPT

## 2018-03-28 PROCEDURE — 85025 COMPLETE CBC W/AUTO DIFF WBC: CPT

## 2018-03-28 PROCEDURE — 85610 PROTHROMBIN TIME: CPT

## 2018-05-16 ENCOUNTER — PATIENT OUTREACH (OUTPATIENT)
Dept: HEALTH INFORMATION MANAGEMENT | Facility: OTHER | Age: 83
End: 2018-05-16

## 2018-05-30 ENCOUNTER — OFFICE VISIT (OUTPATIENT)
Dept: MEDICAL GROUP | Facility: PHYSICIAN GROUP | Age: 83
End: 2018-05-30
Payer: MEDICARE

## 2018-05-30 ENCOUNTER — HOSPITAL ENCOUNTER (OUTPATIENT)
Dept: RADIOLOGY | Facility: MEDICAL CENTER | Age: 83
End: 2018-05-30
Attending: NURSE PRACTITIONER
Payer: MEDICARE

## 2018-05-30 VITALS
WEIGHT: 137 LBS | DIASTOLIC BLOOD PRESSURE: 68 MMHG | HEIGHT: 66 IN | HEART RATE: 114 BPM | RESPIRATION RATE: 18 BRPM | SYSTOLIC BLOOD PRESSURE: 120 MMHG | BODY MASS INDEX: 22.02 KG/M2 | OXYGEN SATURATION: 95 % | TEMPERATURE: 97.2 F

## 2018-05-30 DIAGNOSIS — M54.50 ACUTE BILATERAL LOW BACK PAIN WITHOUT SCIATICA: ICD-10-CM

## 2018-05-30 PROCEDURE — 72100 X-RAY EXAM L-S SPINE 2/3 VWS: CPT

## 2018-05-30 PROCEDURE — 99214 OFFICE O/P EST MOD 30 MIN: CPT | Performed by: NURSE PRACTITIONER

## 2018-05-30 RX ORDER — FUROSEMIDE 40 MG/1
40 TABLET ORAL DAILY
COMMUNITY
End: 2018-07-24 | Stop reason: SDUPTHER

## 2018-05-30 ASSESSMENT — PATIENT HEALTH QUESTIONNAIRE - PHQ9: CLINICAL INTERPRETATION OF PHQ2 SCORE: 0

## 2018-05-30 NOTE — PROGRESS NOTES
"Chief Complaint   Patient presents with   • Low Back Pain     x's 1 week        Subjective:   Zainab Hwang is a 83 y.o. female patient her of [place] today for evaluation and management of:    Acute bilateral low back pain without sciatica  Started one week ago with low back pain.  No radiation to legs/buttocks.  No fall or injury.  Recent AVR in April.   Last dexa in 2015 showed osteopenia. Rates the pain as a 7/10.  Has taken some tylenol. Has been using heat with some relief.          Current medicines (including changes today)  Current Outpatient Prescriptions   Medication Sig Dispense Refill   • furosemide (LASIX) 40 MG Tab Take 40 mg by mouth every day.     • flecainide (TAMBOCOR) 50 MG tablet Take 50 mg by mouth 2 times a day.     • metoprolol (LOPRESSOR) 100 MG Tab Take 100 mg by mouth 2 times a day.     • Multiple Vitamin (MULTI-VITAMINS PO) Take  by mouth.     • vitamin D (CHOLECALCIFEROL) 1000 UNIT TABS Take 1,000 Units by mouth 2 Times a Day.     • fexofenadine (ALLEGRA) 180 MG tablet TAKE 1 TAB BY MOUTH EVERY DAY. 90 Tab 3     No current facility-administered medications for this visit.      She  has a past medical history of Allergic rhinitis (9/9/2014); Atrial fibrillation ([facility]) (2017); Basal cell carcinoma; GERD (gastroesophageal reflux disease) (9/9/2014); Hip fracture ([facility]) (2013); Hyperparathyroidism ([facility]); Hypertension (9/9/2014); Recurrent peptic ulcer disease; S/P cholecystectomy (remote); and S/P subtotal gastrectomy (remote). She also has no past medical history of Anxiety; Asthma; or Depression.    ROS as stated in hpi  No chest pain, no shortness of breath, no abdominal pain       Objective:     Blood pressure 120/68, pulse (!) 114, temperature 36.2 °C (97.2 °F), resp. rate 18, height 1.676 m (5' 6\"), weight 62.1 kg (137 lb), SpO2 95 %. Body mass index is 22.11 kg/m².   Physical Exam:  Constitutional: Alert, no distress.  Skin: Warm, dry, good turgor,no cyanosis, no " rashes in visible areas.  Eye: Equal, round and reactive, conjunctiva clear, lids normal.  Ears: No tenderness, no discharge.  External canals are without any significant edema or erythema.   Gross auditory acuity is intact.  Nose: symmetrical without tenderness, no discharge.  Mouth/Throat: lips without lesion.  Oropharynx clear.  Thr  Neck: Trachea midline, no masses, no obvious thyroid enlargement.. No cervical or supraclavicular lymphadenopathy. Range of motion within normal limits.  Neuro: Cranial nerves 2-12 grossly intact.  No sensory deficit.  Respiratory: Unlabored respiratory effort, lungs clear to auscultation, no wheezes, no ronchi.  Cardiovascularirregular rate noted, no murmur, no edema.  Recent Valve surgery  Abdomen: Soft, non-tender, no masses, no guarding,  no hepatosplenomegaly.  MSK: bilateral pain lower lumbar spine and radiating to hips.  No bruising, swelling noted.  Unable to do straight leg raise.   Psych: Alert and oriented x3, normal affect and mood and judgement.        Assessment and Plan:   The following treatment plan was discussed    1. Acute bilateral low back pain without sciatica  This is a new problem to me.  Acute.  Unknown etiology.  Could represent arthritic changes, unknown compression fracture. Strained lumbar muscles.    Xray of lumbar spine ordered.  Discussed heat, otc creams, including icy hot, salon pas.  Tylenol for discomfort.  Stretching exercises reviewed.  Monitor and follow results of xray.    If pathology present, refer appropriated.  Monitor    FINDINGS:  The alignment demonstrates a grade 1 anterior spondylolisthesis at the L4-5 level estimated at 7 mm. There is a trace of degenerative retrolisthesis at the L2-3 level.    There is degenerative endplate spurring at all levels with Schmorl's node endplate irregularity and slight loss of height of the central aspect of L3 and L4 levels. There is bilateral arthropathy predominantly at L4-5 and L5-S1 level. There is    degenerative disc space narrowing at all levels most severe at the L5-S1 level.    Visualized pelvic bony structures are unremarkable. There is degenerative change in both hip joints.    There is postoperative change throughout the abdomen. There is atherosclerosis of the aorta and iliac vessels.   Impression       1.  There is degenerative disc disease and arthropathy most significant at the L4-5 and L5-S1 levels.  2.  There is a grade 1 anterior spondylolisthesis of L4-5 level.  3.  There is a trace of degenerative retrolisthesis at the L2-3 level.  4.  There is atherosclerosis.   Reading Provider Reading Date   Chelly Murcia M.D. May 30, 2018   Signing Provider Signing Date Signing Time   Chelly Murcia M.D. May 30, 2018 10:57 AM     Will contact patient and let her know results of xray and discuss possible referral to spine specialist.     - DX-LUMBAR SPINE-2 OR 3 VIEWS; Future      Followup: Return if symptoms worsen or fail to improve.

## 2018-05-30 NOTE — ASSESSMENT & PLAN NOTE
Started one week ago with low back pain.  No radiation to legs/buttocks.  No fall or injury.  Recent AVR in April.   Last dexa in 2015 showed osteopenia. Rates the pain as a 7/10.  Has taken some tylenol. Has been using heat with some relief.

## 2018-05-30 NOTE — Clinical Note
Jose, This pleasant patient with new onset lower bilateral back pain.  I have ordered an xray, but I will miss talking to you and follow up as I am out of the office June 1-10.   Please see my note STEPHAN Persaud.

## 2018-05-31 ENCOUNTER — TELEPHONE (OUTPATIENT)
Dept: MEDICAL GROUP | Facility: PHYSICIAN GROUP | Age: 83
End: 2018-05-31

## 2018-05-31 NOTE — TELEPHONE ENCOUNTER
----- Message from NEVIN Persaud sent at 5/30/2018 12:03 PM PDT -----  Please let patient know that lumbar spine xray showed quite a bit of degeneration of spine, particularly l5-s1 which would correlate with where her pain is located.  No fracture.  She may benefit from a consultation with a spine specialist, or she can continue with conservative treatment and give it some time.  Let me know what she would like to do.  NEVIN Persaud

## 2018-06-05 ENCOUNTER — TELEPHONE (OUTPATIENT)
Dept: MEDICAL GROUP | Facility: PHYSICIAN GROUP | Age: 83
End: 2018-06-05

## 2018-06-05 NOTE — TELEPHONE ENCOUNTER
Please let her know I've reviewed the note from her visit with Denise and it was not clear to me whether she wanted to have a referral to spine specialist or if she wanted to do conservative treatment for a little while and monitor for improvement. Please let me know what she would like to do    STEPHAN Gomez.

## 2018-06-06 NOTE — TELEPHONE ENCOUNTER
Patient does not want a referral she does want to come see you and is already scheduled to come in on June 13. She stated that she will monitor and keep you updated.

## 2018-06-12 ENCOUNTER — TELEPHONE (OUTPATIENT)
Dept: MEDICAL GROUP | Facility: PHYSICIAN GROUP | Age: 83
End: 2018-06-12

## 2018-06-12 NOTE — TELEPHONE ENCOUNTER
"· Home Health paperwork received from [facility] requiring provider signature.     · All appropriate fields completed by Medical Assistant: No    · Paperwork placed in \"MA to Provider\" folder/basket. Awaiting provider completion/signature.  "

## 2018-06-13 ENCOUNTER — OFFICE VISIT (OUTPATIENT)
Dept: MEDICAL GROUP | Facility: PHYSICIAN GROUP | Age: 83
End: 2018-06-13
Payer: MEDICARE

## 2018-06-13 ENCOUNTER — HOSPITAL ENCOUNTER (OUTPATIENT)
Dept: LAB | Facility: MEDICAL CENTER | Age: 83
End: 2018-06-13
Attending: NURSE PRACTITIONER
Payer: MEDICARE

## 2018-06-13 VITALS
BODY MASS INDEX: 22.02 KG/M2 | DIASTOLIC BLOOD PRESSURE: 66 MMHG | HEART RATE: 110 BPM | OXYGEN SATURATION: 95 % | SYSTOLIC BLOOD PRESSURE: 126 MMHG | TEMPERATURE: 98.5 F | WEIGHT: 137 LBS | HEIGHT: 66 IN

## 2018-06-13 DIAGNOSIS — M54.50 INTERMITTENT LOW BACK PAIN: ICD-10-CM

## 2018-06-13 DIAGNOSIS — I48.19 PERSISTENT ATRIAL FIBRILLATION (HCC): ICD-10-CM

## 2018-06-13 DIAGNOSIS — Z95.2 S/P MITRAL VALVE REPLACEMENT: ICD-10-CM

## 2018-06-13 DIAGNOSIS — K92.2 GASTROINTESTINAL HEMORRHAGE, UNSPECIFIED GASTROINTESTINAL HEMORRHAGE TYPE: ICD-10-CM

## 2018-06-13 DIAGNOSIS — Z09 HOSPITAL DISCHARGE FOLLOW-UP: ICD-10-CM

## 2018-06-13 LAB
ALBUMIN SERPL BCP-MCNC: 4.1 G/DL (ref 3.2–4.9)
ALBUMIN/GLOB SERPL: 1.1 G/DL
ALP SERPL-CCNC: 105 U/L (ref 30–99)
ALT SERPL-CCNC: 13 U/L (ref 2–50)
ANION GAP SERPL CALC-SCNC: 12 MMOL/L (ref 0–11.9)
ANISOCYTOSIS BLD QL SMEAR: ABNORMAL
AST SERPL-CCNC: 27 U/L (ref 12–45)
BASOPHILS # BLD AUTO: 0.4 % (ref 0–1.8)
BASOPHILS # BLD: 0.03 K/UL (ref 0–0.12)
BILIRUB SERPL-MCNC: 0.9 MG/DL (ref 0.1–1.5)
BUN SERPL-MCNC: 16 MG/DL (ref 8–22)
CALCIUM SERPL-MCNC: 10.3 MG/DL (ref 8.5–10.5)
CHLORIDE SERPL-SCNC: 101 MMOL/L (ref 96–112)
CO2 SERPL-SCNC: 24 MMOL/L (ref 20–33)
COMMENT 1642: NORMAL
CREAT SERPL-MCNC: 1.08 MG/DL (ref 0.5–1.4)
EOSINOPHIL # BLD AUTO: 0.07 K/UL (ref 0–0.51)
EOSINOPHIL NFR BLD: 0.9 % (ref 0–6.9)
ERYTHROCYTE [DISTWIDTH] IN BLOOD BY AUTOMATED COUNT: 65.5 FL (ref 35.9–50)
GIANT PLATELETS BLD QL SMEAR: NORMAL
GLOBULIN SER CALC-MCNC: 3.6 G/DL (ref 1.9–3.5)
GLUCOSE SERPL-MCNC: 90 MG/DL (ref 65–99)
HCT VFR BLD AUTO: 36.4 % (ref 37–47)
HGB BLD-MCNC: 11 G/DL (ref 12–16)
IMM GRANULOCYTES # BLD AUTO: 0.1 K/UL (ref 0–0.11)
IMM GRANULOCYTES NFR BLD AUTO: 1.2 % (ref 0–0.9)
LYMPHOCYTES # BLD AUTO: 4.36 K/UL (ref 1–4.8)
LYMPHOCYTES NFR BLD: 54.3 % (ref 22–41)
MCH RBC QN AUTO: 31.3 PG (ref 27–33)
MCHC RBC AUTO-ENTMCNC: 30.2 G/DL (ref 33.6–35)
MCV RBC AUTO: 103.7 FL (ref 81.4–97.8)
MICROCYTES BLD QL SMEAR: ABNORMAL
MONOCYTES # BLD AUTO: 1.2 K/UL (ref 0–0.85)
MONOCYTES NFR BLD AUTO: 14.9 % (ref 0–13.4)
MORPHOLOGY BLD-IMP: NORMAL
NEUTROPHILS # BLD AUTO: 2.27 K/UL (ref 2–7.15)
NEUTROPHILS NFR BLD: 28.3 % (ref 44–72)
NRBC # BLD AUTO: 0 K/UL
NRBC BLD-RTO: 0 /100 WBC
PLATELET # BLD AUTO: 256 K/UL (ref 164–446)
PLATELET BLD QL SMEAR: NORMAL
PMV BLD AUTO: 11.3 FL (ref 9–12.9)
POIKILOCYTOSIS BLD QL SMEAR: NORMAL
POLYCHROMASIA BLD QL SMEAR: NORMAL
POTASSIUM SERPL-SCNC: 3.6 MMOL/L (ref 3.6–5.5)
PROT SERPL-MCNC: 7.7 G/DL (ref 6–8.2)
RBC # BLD AUTO: 3.51 M/UL (ref 4.2–5.4)
RBC BLD AUTO: PRESENT
SCHISTOCYTES BLD QL SMEAR: NORMAL
SMUDGE CELLS BLD QL SMEAR: NORMAL
SODIUM SERPL-SCNC: 137 MMOL/L (ref 135–145)
TSH SERPL DL<=0.005 MIU/L-ACNC: 2.97 UIU/ML (ref 0.38–5.33)
WBC # BLD AUTO: 8 K/UL (ref 4.8–10.8)

## 2018-06-13 PROCEDURE — 85025 COMPLETE CBC W/AUTO DIFF WBC: CPT

## 2018-06-13 PROCEDURE — 36415 COLL VENOUS BLD VENIPUNCTURE: CPT

## 2018-06-13 PROCEDURE — 80053 COMPREHEN METABOLIC PANEL: CPT

## 2018-06-13 PROCEDURE — 99214 OFFICE O/P EST MOD 30 MIN: CPT | Performed by: NURSE PRACTITIONER

## 2018-06-13 PROCEDURE — 84443 ASSAY THYROID STIM HORMONE: CPT

## 2018-06-13 RX ORDER — TIZANIDINE 2 MG/1
2-4 TABLET ORAL EVERY 12 HOURS PRN
Qty: 30 TAB | Refills: 0 | Status: SHIPPED | OUTPATIENT
Start: 2018-06-13 | End: 2018-07-05 | Stop reason: SDUPTHER

## 2018-06-13 RX ORDER — AZELASTINE 1 MG/ML
1 SPRAY, METERED NASAL 2 TIMES DAILY
Qty: 30 ML | Refills: 0 | Status: SHIPPED | OUTPATIENT
Start: 2018-06-13

## 2018-06-13 RX ORDER — IPRATROPIUM BROMIDE 21 UG/1
2 SPRAY, METERED NASAL EVERY 12 HOURS
Qty: 1 BOTTLE | Refills: 0 | Status: SHIPPED | OUTPATIENT
Start: 2018-06-13

## 2018-06-13 NOTE — PROGRESS NOTES
Subjective:     Chief Complaint   Patient presents with   • Hospital Follow-up     open heart    • Back Pain     for long hours of sitting       HPI  Zainab Hwang is a 83 y.o. female here today for hospital discharge f/u    Has new onset A-Fib Sept 2017. Has been following cardiology Dr. Mohsen who found mitral valvular disease. On 4/17/18 had open heart surgery with valve replacement with Dr. Menon. I do not have these records to review. She was then on Xarelto for AFib and developed GIB while in therapy at [facility]. EGD completed 5/16 and found to have friable stomach with no obvious ulcers. She is no longer on anticoagulation since surgery. She is on iron supplementation, but unsure of dose. We need f/u on CBC and stool test. She has not followed up with GI specialist. It was Dr. Salgado. If all is negative with GIB, the plan will be ROMEO with cardioversion after one month of anticoagulation for her AFib. She does not have appt scheduled with Dr. Mohsen.     Acute bilateral low back pain without sciatica  Sh has lower back pain over the past 10 years. It is intermittent. This episode started one month ago. Location of pain moves around mid to lower back, but never to LE. Sitting or laying down for long time periods increases pain. Uses heating pad all the time and back support thermal pads.   5/30/2018 10:21 AM    HISTORY/REASON FOR EXAM:  Chronic low back pain      TECHNIQUE/ EXAM DESCRIPTION AND NUMBER OF VIEWS:  3 views of the lumbar spine.    COMPARISON: None.    FINDINGS:  The alignment demonstrates a grade 1 anterior spondylolisthesis at the L4-5 level estimated at 7 mm. There is a trace of degenerative retrolisthesis at the L2-3 level.    There is degenerative endplate spurring at all levels with Schmorl's node endplate irregularity and slight loss of height of the central aspect of L3 and L4 levels. There is bilateral arthropathy predominantly at L4-5 and L5-S1 level. There is   degenerative disc  space narrowing at all levels most severe at the L5-S1 level.    Visualized pelvic bony structures are unremarkable. There is degenerative change in both hip joints.    There is postoperative change throughout the abdomen. There is atherosclerosis of the aorta and iliac vessels.   Impression       1.  There is degenerative disc disease and arthropathy most significant at the L4-5 and L5-S1 levels.  2.  There is a grade 1 anterior spondylolisthesis of L4-5 level.  3.  There is a trace of degenerative retrolisthesis at the L2-3 level.  4.  There is atherosclerosis.        Diagnoses of Hospital discharge follow-up, Persistent atrial fibrillation (HCC), Gastrointestinal hemorrhage, unspecified gastrointestinal hemorrhage type, and Intermittent low back pain were pertinent to this visit.    Allergies: Ace inhibitors and Statins [hmg-coa-r inhibitors]  Current medicines (including changes today)  Current Outpatient Prescriptions   Medication Sig Dispense Refill   • tizanidine (ZANAFLEX) 2 MG tablet Take 1-2 Tabs by mouth every 12 hours as needed (stiffness/muscle spasm). 30 Tab 0   • azelastine (ASTELIN) 137 MCG/SPRAY nasal spray [place] 1 Spray in nose 2 times a day. 30 mL 0   • ipratropium (ATROVENT) 0.03 % Solution Spray 2 Sprays in nose every 12 hours. 1 Bottle 0   • furosemide (LASIX) 40 MG Tab Take 40 mg by mouth every day.     • flecainide (TAMBOCOR) 50 MG tablet Take 50 mg by mouth 2 times a day.     • metoprolol (LOPRESSOR) 100 MG Tab Take 100 mg by mouth 2 times a day.     • Multiple Vitamin (MULTI-VITAMINS PO) Take  by mouth.     • vitamin D (CHOLECALCIFEROL) 1000 UNIT TABS Take 1,000 Units by mouth 2 Times a Day.     • fexofenadine (ALLEGRA) 180 MG tablet TAKE 1 TAB BY MOUTH EVERY DAY. 90 Tab 3     No current facility-administered medications for this visit.        She  has a past medical history of Allergic rhinitis (9/9/2014); Atrial fibrillation (HCC) (2017); Basal cell carcinoma; GERD (gastroesophageal  "reflux disease) (9/9/2014); Hip fracture (HCC) (2013); Hyperparathyroidism (HCC); Hypertension (9/9/2014); Recurrent peptic ulcer disease; S/P cholecystectomy (remote); and S/P subtotal gastrectomy (remote). She also has no past medical history of Anxiety; Asthma; or Depression.    Health Maintenance: UTD    ROS  As stated in HPI and additionally  Gen: No fatigue or malaise  Neuro: No dizziness  CV: +palpitations. No chest pain, WILSON, syncope, or LE edema  Pulm: No sob or dyspnea     Objective:     Blood pressure 126/66, pulse (!) 110, temperature 36.9 °C (98.5 °F), height 1.676 m (5' 6\"), weight 62.1 kg (137 lb), SpO2 95 %. Body mass index is 22.11 kg/m².  Physical Exam:  General: Alert, oriented, in no acute distress.  Eye contact is good, speech goal directed, affect calm  CNs grossly intact.  HEENT: conjunctiva non-injected, sclera non-icteric, EOMs intact. No lid edema or eye drainage.   Gross hearing intact.  Nasal turbinates without edema or drainage.   Neck: Supple. No adenopathy or masses in the cervical or supraclavicular regions  Lungs: unlabored. clear to auscultation bilaterally with good excursion.  CV: regular rate and irregular rhythm. No murmurs  Ext: no edema, normal color and temperature.   Skin: No rashes or lesions in visible areas. Chest incision healing well without erythema, edema, or drainage  Gait steady.     Assessment and Plan:   Assessment/Plan:  1. Hospital discharge follow-up  Stable. I have reviewed all hospital records including lab results, imaging studies, EKG, progress notes, and admission and discharge summaries. Educated patient on s/sx to observe for and what action to take when these occur. Reviewed current medications and educated on importance of compliance with these medications all appropriate follow-up visits are scheduled.     2. Persistent atrial fibrillation (HCC)  Stable. Check labs. f/u with Dr. Mohsen. She will call for appointment today   - COMP METABOLIC PANEL; " Future  - TSH WITH REFLEX TO FT4; Future    3. Gastrointestinal hemorrhage, unspecified gastrointestinal hemorrhage type  Unsure if resolved Check labs and stool test. If negative, will inform cardiology okay for anticoagulation   - CBC WITH DIFFERENTIAL; Future  - OCCULT BLOOD FECES IMMUNOASSAY; Future    4. Intermittent low back pain  Ongoing. Enc continued heating pad and regular walking. Start tizanidine. If not helping, will refer to PT and spine specialist  - tizanidine (ZANAFLEX) 2 MG tablet; Take 1-2 Tabs by mouth every 12 hours as needed (stiffness/muscle spasm).  Dispense: 30 Tab; Refill: 0    5. s/p mitral valve replacement  Stable she will call today to schedule with cardiology for f/u        Follow up:  Return in about 6 months (around 12/13/2018).    Educated in proper administration of medication(s) ordered today including safety, possible SE, risks, benefits, rationale and alternatives to therapy.   Supportive care, differential diagnoses, and indications for immediate follow-up discussed with patient.    Pathogenesis of diagnosis discussed including typical length and natural progression.    Instructed to return to clinic or nearest emergency department for any change in condition, further concerns, or worsening of symptoms.  Patient states understanding of the plan of care and discharge instructions.      Please note that this dictation was created using voice recognition software. I have made every reasonable attempt to correct obvious errors, but I expect that there are errors of grammar and possibly content that I did not discover before finalizing the note.    Followup: Return in about 6 months (around 12/13/2018). sooner should new symptoms or problems arise.

## 2018-06-13 NOTE — ASSESSMENT & PLAN NOTE
Sh has lower back pain over the past 10 years. It is intermittent. This episode started one month ago. Location of pain moves around mid to lower back, but never to LE. Sitting or laying down for long time periods increases pain. Uses heating pad all the time and back support thermal pads.   5/30/2018 10:21 AM    HISTORY/REASON FOR EXAM:  Chronic low back pain      TECHNIQUE/ EXAM DESCRIPTION AND NUMBER OF VIEWS:  3 views of the lumbar spine.    COMPARISON: None.    FINDINGS:  The alignment demonstrates a grade 1 anterior spondylolisthesis at the L4-5 level estimated at 7 mm. There is a trace of degenerative retrolisthesis at the L2-3 level.    There is degenerative endplate spurring at all levels with Schmorl's node endplate irregularity and slight loss of height of the central aspect of L3 and L4 levels. There is bilateral arthropathy predominantly at L4-5 and L5-S1 level. There is   degenerative disc space narrowing at all levels most severe at the L5-S1 level.    Visualized pelvic bony structures are unremarkable. There is degenerative change in both hip joints.    There is postoperative change throughout the abdomen. There is atherosclerosis of the aorta and iliac vessels.   Impression       1.  There is degenerative disc disease and arthropathy most significant at the L4-5 and L5-S1 levels.  2.  There is a grade 1 anterior spondylolisthesis of L4-5 level.  3.  There is a trace of degenerative retrolisthesis at the L2-3 level.  4.  There is atherosclerosis.

## 2018-06-14 ENCOUNTER — HOSPITAL ENCOUNTER (OUTPATIENT)
Facility: MEDICAL CENTER | Age: 83
End: 2018-06-14
Attending: NURSE PRACTITIONER
Payer: MEDICARE

## 2018-06-14 PROCEDURE — 82274 ASSAY TEST FOR BLOOD FECAL: CPT

## 2018-06-17 DIAGNOSIS — K92.2 GASTROINTESTINAL HEMORRHAGE, UNSPECIFIED GASTROINTESTINAL HEMORRHAGE TYPE: ICD-10-CM

## 2018-06-18 LAB — HEMOCCULT STL QL IA: NEGATIVE

## 2018-07-05 ENCOUNTER — OFFICE VISIT (OUTPATIENT)
Dept: MEDICAL GROUP | Facility: PHYSICIAN GROUP | Age: 83
End: 2018-07-05
Payer: MEDICARE

## 2018-07-05 VITALS
HEART RATE: 114 BPM | TEMPERATURE: 98.3 F | OXYGEN SATURATION: 91 % | DIASTOLIC BLOOD PRESSURE: 66 MMHG | WEIGHT: 130 LBS | BODY MASS INDEX: 20.89 KG/M2 | SYSTOLIC BLOOD PRESSURE: 118 MMHG | HEIGHT: 66 IN

## 2018-07-05 DIAGNOSIS — G89.29 CHRONIC BILATERAL LOW BACK PAIN WITHOUT SCIATICA: ICD-10-CM

## 2018-07-05 DIAGNOSIS — M54.50 CHRONIC BILATERAL LOW BACK PAIN WITHOUT SCIATICA: ICD-10-CM

## 2018-07-05 PROCEDURE — 99213 OFFICE O/P EST LOW 20 MIN: CPT | Performed by: NURSE PRACTITIONER

## 2018-07-05 RX ORDER — METHYLPREDNISOLONE 4 MG/1
TABLET ORAL
Qty: 21 TAB | Refills: 0 | Status: SHIPPED | OUTPATIENT
Start: 2018-07-05 | End: 2018-08-23

## 2018-07-05 RX ORDER — TIZANIDINE 2 MG/1
2-4 TABLET ORAL EVERY 12 HOURS PRN
Qty: 60 TAB | Refills: 1 | Status: SHIPPED | OUTPATIENT
Start: 2018-07-05 | End: 2018-08-23

## 2018-07-05 NOTE — ASSESSMENT & PLAN NOTE
Marianela has lower back pain over the past 10 years. It is intermittent. This episode started 2 months ago now. Location of pain moves around mid to lower back, but never to LE. Sitting or laying down for long time periods increases pain. Uses heating pad all the time and back support thermal pads.     3 weeks ago I started her on tizanidine. This resolved her pain for about 2 weeks, but now the pain has returned. Now it is worse and is actually moving into right upper leg. The pain in leg is aching.   When she is sitting resting, she is okay, but when she gets up to do anything, the pain significantly worsens.     5/30/2018 10:21 AM    HISTORY/REASON FOR EXAM:  Chronic low back pain      TECHNIQUE/ EXAM DESCRIPTION AND NUMBER OF VIEWS:  3 views of the lumbar spine.    COMPARISON: None.    FINDINGS:  The alignment demonstrates a grade 1 anterior spondylolisthesis at the L4-5 level estimated at 7 mm. There is a trace of degenerative retrolisthesis at the L2-3 level.    There is degenerative endplate spurring at all levels with Schmorl's node endplate irregularity and slight loss of height of the central aspect of L3 and L4 levels. There is bilateral arthropathy predominantly at L4-5 and L5-S1 level. There is   degenerative disc space narrowing at all levels most severe at the L5-S1 level.    Visualized pelvic bony structures are unremarkable. There is degenerative change in both hip joints.    There is postoperative change throughout the abdomen. There is atherosclerosis of the aorta and iliac vessels.   Impression       1.  There is degenerative disc disease and arthropathy most significant at the L4-5 and L5-S1 levels.  2.  There is a grade 1 anterior spondylolisthesis of L4-5 level.  3.  There is a trace of degenerative retrolisthesis at the L2-3 level.  4.  There is atherosclerosis.

## 2018-07-06 NOTE — PROGRESS NOTES
Subjective:     Chief Complaint   Patient presents with   • Back Pain     for many years very painful       HPI  Zainab Hwang is a 83 y.o. female here today for back pain     Acute bilateral low back pain without sciatica  Sh has lower back pain over the past 10 years. It is intermittent. This episode started 2 months ago now. Location of pain moves around mid to lower back, but never to LE. Sitting or laying down for long time periods increases pain. Uses heating pad all the time and back support thermal pads.   3 weeks ago I started her on tizanidine. This resolved her pain for about 2 weeks, but now the pain has returned. Now it is worse and is actually moving into right upper leg. The pain in leg is aching. When she is sitting resting, she is okay, but when she gets up to do anything, the pain significantly worsens.     5/30/2018 10:21 AM    HISTORY/REASON FOR EXAM:  Chronic low back pain      TECHNIQUE/ EXAM DESCRIPTION AND NUMBER OF VIEWS:  3 views of the lumbar spine.    COMPARISON: None.    FINDINGS:  The alignment demonstrates a grade 1 anterior spondylolisthesis at the L4-5 level estimated at 7 mm. There is a trace of degenerative retrolisthesis at the L2-3 level.    There is degenerative endplate spurring at all levels with Schmorl's node endplate irregularity and slight loss of height of the central aspect of L3 and L4 levels. There is bilateral arthropathy predominantly at L4-5 and L5-S1 level. There is   degenerative disc space narrowing at all levels most severe at the L5-S1 level.    Visualized pelvic bony structures are unremarkable. There is degenerative change in both hip joints.    There is postoperative change throughout the abdomen. There is atherosclerosis of the aorta and iliac vessels.   Impression       1.  There is degenerative disc disease and arthropathy most significant at the L4-5 and L5-S1 levels.  2.  There is a grade 1 anterior spondylolisthesis of L4-5 level.  3.  There is  "a trace of degenerative retrolisthesis at the L2-3 level.  4.  There is atherosclerosis.            The encounter diagnosis was Chronic bilateral low back pain without sciatica.    Allergies: Ace inhibitors and Statins [hmg-coa-r inhibitors]  Current medicines (including changes today)  Current Outpatient Prescriptions   Medication Sig Dispense Refill   • MethylPREDNISolone (MEDROL DOSEPAK) 4 MG Tablet Therapy Pack Follow package instructions 21 Tab 0   • tizanidine (ZANAFLEX) 2 MG tablet Take 1-2 Tabs by mouth every 12 hours as needed (stiffness/muscle spasm). 60 Tab 1   • azelastine (ASTELIN) 137 MCG/SPRAY nasal spray [place] 1 Spray in nose 2 times a day. 30 mL 0   • ipratropium (ATROVENT) 0.03 % Solution Spray 2 Sprays in nose every 12 hours. 1 Bottle 0   • furosemide (LASIX) 40 MG Tab Take 40 mg by mouth every day.     • flecainide (TAMBOCOR) 50 MG tablet Take 50 mg by mouth 2 times a day.     • metoprolol (LOPRESSOR) 100 MG Tab Take 100 mg by mouth 2 times a day.     • Multiple Vitamin (MULTI-VITAMINS PO) Take  by mouth.     • vitamin D (CHOLECALCIFEROL) 1000 UNIT TABS Take 1,000 Units by mouth 2 Times a Day.     • fexofenadine (ALLEGRA) 180 MG tablet TAKE 1 TAB BY MOUTH EVERY DAY. 90 Tab 3     No current facility-administered medications for this visit.        She  has a past medical history of Allergic rhinitis (9/9/2014); Atrial fibrillation ([facility]) (2017); Basal cell carcinoma; GERD (gastroesophageal reflux disease) (9/9/2014); Hip fracture ([facility]) (2013); Hyperparathyroidism ([facility]); Hypertension (9/9/2014); Recurrent peptic ulcer disease; S/P cholecystectomy (remote); and S/P subtotal gastrectomy (remote). She also has no past medical history of Anxiety; Asthma; or Depression.    Health Maintenance: UTD    ROS  As stated in HPI      Objective:     Blood pressure 118/66, pulse (!) 114, temperature 36.8 °C (98.3 °F), height 1.676 m (5' 6\"), weight 59 kg (130 lb), SpO2 91 %. Body mass index is 20.98 " kg/m².  Physical Exam:  General: Alert, oriented, in no acute distress.  Eye contact is good, speech goal directed, affect calm  CNs grossly intact.  HEENT: conjunctiva non-injected, sclera non-icteric, EOMs intact. No lid edema or eye drainage.   Gross hearing intact.  MSK: Spine without deformity and no significant curvature on forward bend. Thoracic and lumbar paraspinous muscles with tenderness, but no spasm. + lumbar spinous process tenderness. No SI joint tenderness. Full hip ROM bilat. SLT negative (difficult to assess as she had difficulty lifting against resistance). Sensation intact to light touch and pin prick.  Ext: no edema, normal color and temperature.   Skin: No rashes or lesions in visible areas  Gait steady.     Assessment and Plan:   Assessment/Plan:  1. Chronic bilateral low back pain without sciatica  Not controlled. Start medrol pack. Continue tizanidine. Enc heating pad alternating with ice. Refer to PT. Do at least 6 PT sessions and if pain not improving, then may obtain MRI. Refer to physiatry now so that if we do end up needing consult, she already has appointment scheduled for down the road.  - MethylPREDNISolone (MEDROL DOSEPAK) 4 MG Tablet Therapy Pack; Follow package instructions  Dispense: 21 Tab; Refill: 0  - tizanidine (ZANAFLEX) 2 MG tablet; Take 1-2 Tabs by mouth every 12 hours as needed (stiffness/muscle spasm).  Dispense: 60 Tab; Refill: 1  - REFERRAL TO PHYSICAL THERAPY Reason for Therapy: Eval/Treat/Report  - MR-LUMBAR SPINE-W/O; Future  - REFERRAL TO PHYSIATRY (PMR)       Follow up:  Return in about 6 weeks (around 8/16/2018).    Educated in proper administration of medication(s) ordered today including safety, possible SE, risks, benefits, rationale and alternatives to therapy.   Supportive care, differential diagnoses, and indications for immediate follow-up discussed with patient.    Pathogenesis of diagnosis discussed including typical length and natural progression.     Instructed to return to clinic or nearest emergency department for any change in condition, further concerns, or worsening of symptoms.  Patient states understanding of the plan of care and discharge instructions.      Please note that this dictation was created using voice recognition software. I have made every reasonable attempt to correct obvious errors, but I expect that there are errors of grammar and possibly content that I did not discover before finalizing the note.    Followup: Return in about 6 weeks (around 8/16/2018). sooner should new symptoms or problems arise.

## 2018-07-09 ENCOUNTER — OFFICE VISIT (OUTPATIENT)
Dept: URGENT CARE | Facility: PHYSICIAN GROUP | Age: 83
End: 2018-07-09
Payer: MEDICARE

## 2018-07-09 VITALS
HEIGHT: 66 IN | DIASTOLIC BLOOD PRESSURE: 90 MMHG | HEART RATE: 144 BPM | OXYGEN SATURATION: 94 % | WEIGHT: 130 LBS | BODY MASS INDEX: 20.89 KG/M2 | SYSTOLIC BLOOD PRESSURE: 130 MMHG | TEMPERATURE: 97.7 F | RESPIRATION RATE: 18 BRPM

## 2018-07-09 DIAGNOSIS — M54.41 ACUTE RIGHT-SIDED BACK PAIN WITH SCIATICA: ICD-10-CM

## 2018-07-09 PROCEDURE — 99214 OFFICE O/P EST MOD 30 MIN: CPT | Performed by: FAMILY MEDICINE

## 2018-07-09 ASSESSMENT — ENCOUNTER SYMPTOMS
PERIANAL NUMBNESS: 0
TINGLING: 0
BACK PAIN: 1
WEAKNESS: 0
BOWEL INCONTINENCE: 0
LEG PAIN: 1

## 2018-07-09 ASSESSMENT — PAIN SCALES - GENERAL: PAINLEVEL: 3=SLIGHT PAIN

## 2018-07-09 NOTE — PATIENT INSTRUCTIONS
Back Pain, Adult  Back pain is very common in adults. The cause of back pain is rarely dangerous and the pain often gets better over time. The cause of your back pain may not be known. Some common causes of back pain include:  · Strain of the muscles or ligaments supporting the spine.  · Wear and tear (degeneration) of the spinal disks.  · Arthritis.  · Direct injury to the back.  For many people, back pain may return. Since back pain is rarely dangerous, most people can learn to manage this condition on their own.  Follow these instructions at home:  Watch your back pain for any changes. The following actions may help to lessen any discomfort you are feeling:  · Remain active. It is stressful on your back to sit or  one place for long periods of time. Do not sit, drive, or  one place for more than 30 minutes at a time. Take short walks on even surfaces as soon as you are able. Try to increase the length of time you walk each day.  · Exercise regularly as directed by your health care provider. Exercise helps your back heal faster. It also helps avoid future injury by keeping your muscles strong and flexible.  · Do not stay in bed. Resting more than 1-2 days can delay your recovery.  · Pay attention to your body when you bend and lift. The most comfortable positions are those that put less stress on your recovering back. Always use proper lifting techniques, including:  ¨ Bending your knees.  ¨ Keeping the load close to your body.  ¨ Avoiding twisting.  · Find a comfortable position to sleep. Use a firm mattress and lie on your side with your knees slightly bent. If you lie on your back, put a pillow under your knees.  · Avoid feeling anxious or stressed. Stress increases muscle tension and can worsen back pain. It is important to recognize when you are anxious or stressed and learn ways to manage it, such as with exercise.  · Take medicines only as directed by your health care provider.  Over-the-counter medicines to reduce pain and inflammation are often the most helpful. Your health care provider may prescribe muscle relaxant drugs. These medicines help dull your pain so you can more quickly return to your normal activities and healthy exercise.  · Apply ice to the injured area:  ¨ Put ice in a plastic bag.  ¨ Place a towel between your skin and the bag.  ¨ Leave the ice on for 20 minutes, 2-3 times a day for the first 2-3 days. After that, ice and heat may be alternated to reduce pain and spasms.  · Maintain a healthy weight. Excess weight puts extra stress on your back and makes it difficult to maintain good posture.  Contact a health care provider if:  · You have pain that is not relieved with rest or medicine.  · You have increasing pain going down into the legs or buttocks.  · You have pain that does not improve in one week.  · You have night pain.  · You lose weight.  · You have a fever or chills.  Get help right away if:  · You develop new bowel or bladder control problems.  · You have unusual weakness or numbness in your arms or legs.  · You develop nausea or vomiting.  · You develop abdominal pain.  · You feel faint.  This information is not intended to replace advice given to you by your health care provider. Make sure you discuss any questions you have with your health care provider.  Document Released: 12/18/2006 Document Revised: 04/27/2017 Document Reviewed: 04/21/2015  [employer] Interactive Patient Education © 2017 [employer]

## 2018-07-10 NOTE — PROGRESS NOTES
"Subjective:   Zainab Hwang is a 83 y.o. female who presents for Back Pain (x 3 months, lower back and right hip)        Back Pain   This is a new problem. The current episode started more than 1 month ago. The problem occurs constantly. The problem has been gradually worsening since onset. The pain is present in the lumbar spine and gluteal. The quality of the pain is described as aching. The pain is moderate. Associated symptoms include leg pain. Pertinent negatives include no bladder incontinence, bowel incontinence, perianal numbness, tingling or weakness.     Review of Systems   Gastrointestinal: Negative for bowel incontinence.   Genitourinary: Negative for bladder incontinence.   Musculoskeletal: Positive for back pain.   Neurological: Negative for tingling and weakness.     Allergies   Allergen Reactions   • Ace Inhibitors    • Statins [Hmg-Coa-R Inhibitors] Nausea      Objective:   /90   Pulse (!) 144   Temp 36.5 °C (97.7 °F)   Resp 18   Ht 1.676 m (5' 6\")   Wt 59 kg (130 lb)   SpO2 94%   BMI 20.98 kg/m²   Physical Exam   Constitutional: She is oriented to person, place, and time. She appears well-developed and well-nourished. No distress.   HENT:   Head: Normocephalic and atraumatic.   Eyes: Conjunctivae and EOM are normal. Pupils are equal, round, and reactive to light.   Cardiovascular: Normal rate, regular rhythm, normal heart sounds and intact distal pulses.    No murmur heard.  Pulmonary/Chest: Effort normal and breath sounds normal. No respiratory distress.   Abdominal: Soft. Bowel sounds are normal. She exhibits no distension. There is no tenderness.   Musculoskeletal:        Lumbar back: She exhibits decreased range of motion, tenderness and spasm. She exhibits no bony tenderness and no deformity.   Neurological: She is alert and oriented to person, place, and time. She has normal reflexes. No sensory deficit.   Skin: Skin is warm and dry.   Psychiatric: She has a normal mood " and affect. Her behavior is normal.         Assessment/Plan:   Assessment    1. Acute right-sided back pain with sciatica  Continue prednisone. FU PCP. PT already ordered.   Differential diagnosis, natural history, supportive care, and indications for immediate follow-up discussed.

## 2018-07-23 ENCOUNTER — HOSPITAL ENCOUNTER (OUTPATIENT)
Dept: RADIOLOGY | Facility: MEDICAL CENTER | Age: 83
End: 2018-07-23
Attending: NURSE PRACTITIONER
Payer: MEDICARE

## 2018-07-23 DIAGNOSIS — G89.29 CHRONIC BILATERAL LOW BACK PAIN WITHOUT SCIATICA: ICD-10-CM

## 2018-07-23 DIAGNOSIS — M54.50 CHRONIC BILATERAL LOW BACK PAIN WITHOUT SCIATICA: ICD-10-CM

## 2018-07-23 PROCEDURE — 72148 MRI LUMBAR SPINE W/O DYE: CPT

## 2018-07-24 ENCOUNTER — OFFICE VISIT (OUTPATIENT)
Dept: MEDICAL GROUP | Facility: CLINIC | Age: 83
End: 2018-07-24
Payer: MEDICARE

## 2018-07-24 VITALS
WEIGHT: 127 LBS | DIASTOLIC BLOOD PRESSURE: 76 MMHG | RESPIRATION RATE: 14 BRPM | OXYGEN SATURATION: 93 % | HEIGHT: 66 IN | TEMPERATURE: 98 F | BODY MASS INDEX: 20.41 KG/M2 | SYSTOLIC BLOOD PRESSURE: 142 MMHG | HEART RATE: 116 BPM

## 2018-07-24 DIAGNOSIS — I48.19 PERSISTENT ATRIAL FIBRILLATION (HCC): ICD-10-CM

## 2018-07-24 DIAGNOSIS — R60.0 PEDAL EDEMA: ICD-10-CM

## 2018-07-24 DIAGNOSIS — I34.0 NON-RHEUMATIC MITRAL REGURGITATION: ICD-10-CM

## 2018-07-24 PROBLEM — Z87.19 HISTORY OF GI BLEED: Status: ACTIVE | Noted: 2018-07-24

## 2018-07-24 PROCEDURE — 99214 OFFICE O/P EST MOD 30 MIN: CPT | Performed by: NURSE PRACTITIONER

## 2018-07-24 RX ORDER — FUROSEMIDE 40 MG/1
40 TABLET ORAL
Qty: 30 TAB | Refills: 2 | Status: SHIPPED | OUTPATIENT
Start: 2018-07-24 | End: 2018-08-23 | Stop reason: SDUPTHER

## 2018-07-24 NOTE — LETTER
[facility]  HAI [email or web address].[email or web address]  [address]  [place] [address]  Fax: [phone]   Authorization for Release/Disclosure of   Protected Health Information   Name: ZAINAB NOBLE : 7/15/1934 SSN: [id number]   Address: [address] [place] [id number] Phone:    [phone] (home)    I authorize the entity listed below to release/disclose the PHI below to:   [facility]/RODERICK Gomez.RPEDRO and NEVIN Carrasquillo   Provider or Entity Name:  Dr. Amr M Mohsen, M.D.   Address   City, State, Zip   Phone:  [phone]    Fax:  [phone]   Reason for request: continuity of care   Information to be released:    [  ] LAST COLONOSCOPY,  including any PATH REPORT and follow-up  [  ] LAST FIT/COLOGUARD RESULT [  ] LAST DEXA  [  ] LAST MAMMOGRAM  [  ] LAST PAP  [  ] LAST LABS [  ] RETINA EXAM REPORT  [  ] IMMUNIZATION RECORDS  [X] Release all info      [  ] Check here and initial the line next to each item to release ALL health information INCLUDING  _____ Care and treatment for drug and / or alcohol abuse  _____ HIV testing, infection status, or AIDS  _____ Genetic Testing    DATES OF SERVICE OR TIME PERIOD TO BE DISCLOSED: _____________  I understand and acknowledge that:  * This Authorization may be revoked at any time by you in writing, except if your health information has already been used or disclosed.  * Your health information that will be used or disclosed as a result of you signing this authorization could be re-disclosed by the recipient. If this occurs, your re-disclosed health information may no longer be protected by State or Federal laws.  * You may refuse to sign this Authorization. Your refusal will not affect your ability to obtain treatment.  * This Authorization becomes effective upon signing and will  on (date) __________.      If no date is indicated, this Authorization will  one (1) year from the signature date.    Name: Zainab Caruso  Eri    Signature:   Date:     7/24/2018       PLEASE FAX REQUESTED RECORDS BACK TO: [phone]

## 2018-07-24 NOTE — PROGRESS NOTES
CC: Foot Swelling (bilateral foot swelling that can go up the lower leg x 1 week. Painful and burning.)        HPI:     Zainab is a [facility] patient, all problems are new to me today, presents today for the followin. Pedal edema  Here today with her  with concerns related to swelling in the ankles and lower feet.  At its worse it does extend into the lower leg.  Present for about a week, right is worse than left.  She states historically she does not have had these symptoms before.  Historically she has been on furosemide however she takes only intermittently now per cardiology.  She has never needed to take potassium with it.  She last for a side was maybe a week ago.  She denies previously without furosemide that she had swelling in lower extremities.  No acute injury or trauma.  Historically she has had compression stockings but struggled greatly to get them on so she no longer uses these.    Of note she does have A. fib and has been off of anticoagulation for 2 months.  We discussed this places her at risk for blood clots.  No reports of chest pain or shortness of breath.  She does not have a personal history of any sort of blood clot    2. Persistent atrial fibrillation (HCC)/Non-rheumatic mitral regurgitation S/P valve replacement 2018.  Cardio is Mohsen  She is followed by outside [facility] cardiologist Dr. Mohsen last saw him less than a month ago.  She states multiple medications were discontinued including metoprolol and flecainide.  She states around 6 months ago she presented with A. fib which resolved with what sounds like a cardioversion.  She states then later she developed it again after she had surgery for her mitral valve regurgitation.  This was done I believe in April.  At that time she was on Xarelto, had a GI bleed, had to be hospitalized and transfused.  She is no longer on any sort of anticoagulant including aspirin for the last 2 months.  Per her cardiologist she is to  be starting Eliquis.  Unfortunately she does not have her medications with her today.  Equally unfortunate I do not have access to any of her cardiology records her recent [facility] imaging/labs.    She historically has been on furosemide and has not been taking it regularly as she states it was stopped by cardiology for unknown reason however she denies any known adverse effects.   voices that she was taking Furosemide last week.      She does not have any known history of heart failure, she states that she had an [facility] of her heart [facility] less than a [facility].  Weight is stable    Current Outpatient Prescriptions   Medication Sig Dispense Refill   • furosemide (LASIX) 40 MG Tab Take 1 Tab by mouth 1 time daily as needed. 30 Tab 2   • MethylPREDNISolone (MEDROL DOSEPAK) 4 MG Tablet Therapy Pack Follow package instructions 21 Tab 0   • tizanidine (ZANAFLEX) 2 MG tablet Take 1-2 Tabs by mouth every 12 hours as needed (stiffness/muscle spasm). 60 Tab 1   • azelastine (ASTELIN) 137 MCG/SPRAY nasal spray [place] 1 Spray in nose 2 times a day. 30 mL 0   • ipratropium (ATROVENT) 0.03 % Solution Spray 2 Sprays in nose every 12 hours. 1 Bottle 0   • fexofenadine (ALLEGRA) 180 MG tablet TAKE 1 TAB BY MOUTH EVERY DAY. 90 Tab 3   • flecainide (TAMBOCOR) 50 MG tablet Take 50 mg by mouth 2 times a day.     • metoprolol (LOPRESSOR) 100 MG Tab Take 100 mg by mouth 2 times a day.     • Multiple Vitamin (MULTI-VITAMINS PO) Take  by mouth.     • vitamin D (CHOLECALCIFEROL) 1000 UNIT TABS Take 1,000 Units by mouth 2 Times a Day.       No current facility-administered medications for this visit.      Social History   Substance Use Topics   • Smoking status: Former Smoker     Packs/day: 0.50     Years: 30.00     Types: Cigarettes     Quit date: 10/24/1980   • Smokeless tobacco: Never Used   • Alcohol use 4.2 oz/week     7 Glasses of wine per week     I reviewed patients allergies, problem  "list and medications today in [facility].    ROS: Any/all pertinent positives listed in the HPI, otherwise all others reviewed are negative today.      /76   Pulse (!) 116   Temp 36.7 °C (98 °F)   Resp 14   Ht 1.676 m (5' 6\")   Wt 57.6 kg (127 lb)   SpO2 93%   BMI 20.50 kg/m²     Exam:   Gen: Alert and oriented, No apparent distress. WDWN  Psych: A+Ox3, normal affect and mood  Skin: Warm, dry and intact. Good turgor   No rashes in visible areas.  Eye: Conjunctiva clear, lids normal  ENMT: Lips without lesions, dentures  Lungs: Clear to auscultation bilaterally, no rales or rhonchi   Unlabored respiratory effort.   CV: irRegular rate and rhythm, S1, S2.  Her rhythm is consistent with previous office visits  Ext: No clubbing, cyanosis.  Patient has 1+ pitting edema on her right ankle going up to about mid shin.  She does have increased varicosities on this leg which may be the cause.  Pedal pulses are intact bilaterally.  Trace pitting on the left lower leg and ankle.  There is no associated erythema.  Feet are warm and dry.  No weeping.      Assessment and Plan.   84 y.o. female with the following issues.    1. Pedal edema  Stable.  I do suspect this is more related to age, heat and varicose veins.  However she is at high risk for blood clots and so we will get an ultrasound ordered today and we will help her schedule this.  Patient and her  verbalized understanding on this.  We will try and retrieve records from her cardiologist to have a better understanding of medications.  In the interim I did refill her furosemide which she can take a half or whole tab in the morning.  We did discuss over-the-counter compression stockings which may be easier for her to get on, we discussed she can use some cornstarch or talc powder to aid in this and I do encourage her to use those daytime.    - US-EXTREMITY VENOUS BILATERAL LOWER; Future  - furosemide (LASIX) 40 MG Tab; Take 1 Tab by mouth 1 time daily as needed.  " Dispense: 30 Tab; Refill: 2  - Obtain Results: Other (see comment) (echocardiograms); Obtain Results From: [facility]  - Obtain Results: Other (see comment) (all notes last 12 months); Obtain Results From: Other (see comment) (Dr. Mohsen)    2. Persistent atrial fibrillation (HCC)/ Non-rheumatic mitral regurgitation S/P valve replacement 2018.  Cardio is Mohsen  this appears to be chronic and unchanging.  She is asymptomatic.  She is followed closely by cardiology and recently was in their office.  Apparently there were some medication changes.  She is to start Eliquis per their office and I highly encouraged her to do so.    - US-EXTREMITY VENOUS BILATERAL LOWER; Future  - Obtain Results: Other (see comment) (echocardiograms); Obtain Results From: [facility]  - Obtain Results: Other (see comment) (all notes last 12 months); Obtain Results From: Other (see comment) (Dr. Mohsen)

## 2018-07-24 NOTE — LETTER
Formerly [facility]  HAI GomezP.RDajaNDaja  [address]  [facility] [address]  Fax: [phone]   Authorization for Release/Disclosure of   Protected Health Information   Name: ZAINAB HWANG : 7/15/1934 SSN: [id number]   Address: [address] [place] [address] Phone:    [phone] (home)    I authorize the entity listed below to release/disclose the PHI below to:   [facility]/RODERICK Gomez.RPEDRO and NEVIN Carrasquillo   Provider or Entity Name:  [facility]   Address   City, State, [facility]  Phone:      Fax:     Reason for request: continuity of care   Information to be released:    [  ] LAST COLONOSCOPY,  including any PATH REPORT and follow-up  [  ] LAST FIT/COLOGUARD RESULT [  ] LAST DEXA  [  ] LAST MAMMOGRAM  [  ] LAST PAP  [  ] LAST LABS [  ] RETINA EXAM REPORT  [  ] IMMUNIZATION RECORDS  [X] Release all info: echocardiograms      [  ] Check here and initial the line next to each item to release ALL health information INCLUDING  _____ Care and treatment for drug and / or alcohol abuse  _____ HIV testing, infection status, or AIDS  _____ Genetic Testing    DATES OF SERVICE OR TIME PERIOD TO BE DISCLOSED: _____________  I understand and acknowledge that:  * This Authorization may be revoked at any time by you in writing, except if your health information has already been used or disclosed.  * Your health information that will be used or disclosed as a result of you signing this authorization could be re-disclosed by the recipient. If this occurs, your re-disclosed health information may no longer be protected by State or Federal laws.  * You may refuse to sign this Authorization. Your refusal will not affect your ability to obtain treatment.  * This Authorization becomes effective upon signing and will  on (date) __________.      If no date is indicated, this Authorization will  one (1) year from the signature date.    Name: Zainab Hwang    Signature:   Date:     7/24/2018       PLEASE FAX REQUESTED RECORDS BACK TO: [phone]

## 2018-07-25 DIAGNOSIS — S32.030S CLOSED COMPRESSION FRACTURE OF THIRD LUMBAR VERTEBRA, SEQUELA: ICD-10-CM

## 2018-07-25 PROBLEM — S32.030A CLOSED COMPRESSION FRACTURE OF L3 LUMBAR VERTEBRA: Status: ACTIVE | Noted: 2018-07-25

## 2018-07-26 ENCOUNTER — APPOINTMENT (OUTPATIENT)
Dept: PHYSICAL THERAPY | Facility: REHABILITATION | Age: 83
End: 2018-07-26
Attending: NURSE PRACTITIONER
Payer: MEDICARE

## 2018-07-27 ENCOUNTER — APPOINTMENT (OUTPATIENT)
Dept: PHYSICAL THERAPY | Facility: REHABILITATION | Age: 83
End: 2018-07-27
Attending: NURSE PRACTITIONER
Payer: MEDICARE

## 2018-07-31 ENCOUNTER — APPOINTMENT (OUTPATIENT)
Dept: PHYSICAL THERAPY | Facility: REHABILITATION | Age: 83
End: 2018-07-31
Attending: NURSE PRACTITIONER
Payer: MEDICARE

## 2018-08-01 ENCOUNTER — APPOINTMENT (OUTPATIENT)
Dept: PHYSICAL THERAPY | Facility: REHABILITATION | Age: 83
End: 2018-08-01
Attending: NURSE PRACTITIONER
Payer: MEDICARE

## 2018-08-02 ENCOUNTER — TELEPHONE (OUTPATIENT)
Dept: MEDICAL GROUP | Facility: PHYSICIAN GROUP | Age: 83
End: 2018-08-02

## 2018-08-02 NOTE — TELEPHONE ENCOUNTER
Patient called she stated her ankles are swelling and its effecting her legs now. It is also painful to walk. I inform her we do not have appointment available until 8/8/18. She stated she will just go to urgent care to get checked.

## 2018-08-03 ENCOUNTER — APPOINTMENT (OUTPATIENT)
Dept: PHYSICAL THERAPY | Facility: REHABILITATION | Age: 83
End: 2018-08-03
Attending: NURSE PRACTITIONER
Payer: MEDICARE

## 2018-08-07 ENCOUNTER — APPOINTMENT (OUTPATIENT)
Dept: PHYSICAL THERAPY | Facility: REHABILITATION | Age: 83
End: 2018-08-07
Attending: NURSE PRACTITIONER
Payer: MEDICARE

## 2018-08-08 ENCOUNTER — APPOINTMENT (OUTPATIENT)
Dept: PHYSICAL THERAPY | Facility: REHABILITATION | Age: 83
End: 2018-08-08
Attending: NURSE PRACTITIONER
Payer: MEDICARE

## 2018-08-10 ENCOUNTER — APPOINTMENT (OUTPATIENT)
Dept: PHYSICAL THERAPY | Facility: REHABILITATION | Age: 83
End: 2018-08-10
Attending: NURSE PRACTITIONER
Payer: MEDICARE

## 2018-08-23 ENCOUNTER — OFFICE VISIT (OUTPATIENT)
Dept: MEDICAL GROUP | Facility: PHYSICIAN GROUP | Age: 83
End: 2018-08-23
Payer: MEDICARE

## 2018-08-23 VITALS
WEIGHT: 130 LBS | DIASTOLIC BLOOD PRESSURE: 76 MMHG | SYSTOLIC BLOOD PRESSURE: 118 MMHG | OXYGEN SATURATION: 92 % | TEMPERATURE: 97.7 F | HEART RATE: 105 BPM | BODY MASS INDEX: 20.89 KG/M2 | HEIGHT: 66 IN

## 2018-08-23 DIAGNOSIS — S32.030S CLOSED COMPRESSION FRACTURE OF THIRD LUMBAR VERTEBRA, SEQUELA: ICD-10-CM

## 2018-08-23 DIAGNOSIS — D64.9 NORMOCYTIC ANEMIA: ICD-10-CM

## 2018-08-23 DIAGNOSIS — I10 ESSENTIAL HYPERTENSION: Chronic | ICD-10-CM

## 2018-08-23 DIAGNOSIS — Z09 HOSPITAL DISCHARGE FOLLOW-UP: ICD-10-CM

## 2018-08-23 DIAGNOSIS — I48.19 PERSISTENT ATRIAL FIBRILLATION (HCC): ICD-10-CM

## 2018-08-23 DIAGNOSIS — I50.33 ACUTE ON CHRONIC DIASTOLIC CHF (CONGESTIVE HEART FAILURE) (HCC): ICD-10-CM

## 2018-08-23 PROCEDURE — 99214 OFFICE O/P EST MOD 30 MIN: CPT | Performed by: NURSE PRACTITIONER

## 2018-08-23 RX ORDER — HYDROCODONE BITARTRATE AND ACETAMINOPHEN 5; 325 MG/1; MG/1
1 TABLET ORAL EVERY 12 HOURS PRN
Qty: 28 TAB | Refills: 0 | Status: SHIPPED | OUTPATIENT
Start: 2018-08-23 | End: 2018-10-02 | Stop reason: SDUPTHER

## 2018-08-23 RX ORDER — POTASSIUM CHLORIDE 20 MEQ/1
20 TABLET, EXTENDED RELEASE ORAL DAILY
Qty: 90 TAB | Refills: 3 | Status: SHIPPED | OUTPATIENT
Start: 2018-08-23

## 2018-08-23 RX ORDER — CARVEDILOL 25 MG/1
25 TABLET ORAL 2 TIMES DAILY WITH MEALS
COMMUNITY

## 2018-08-23 RX ORDER — FUROSEMIDE 40 MG/1
40 TABLET ORAL DAILY
Qty: 90 TAB | Refills: 3 | Status: SHIPPED | OUTPATIENT
Start: 2018-08-23

## 2018-08-23 NOTE — ASSESSMENT & PLAN NOTE
Ongoing problem. This was diagnosed last month via MRI showing subacute fracture. She has ongoing significant pain. She did have evaluation with neurosurgery in, but I do not have these records available. Patient cannot tell me what their plan of care is. We have requested these records today and confirmed that follow-up appointment is 9/6

## 2018-08-23 NOTE — PROGRESS NOTES
Subjective:     Chief Complaint   Patient presents with   • Hospital Follow-up     can not remember why she was in hospital       HPI  Zainab Hwang is a 84 y.o. female here today for hospital f/u. Here with .     Admitted to [facility] 8/15-16 for increased leg swelling and pain bilaterally for 2 weeks prior to admission. Has hx of persistent A-Fib. No cardiopulmonary symptoms otherwise, but findings were consistent with acute on chronic diastolic CHF exacerbation with significantly elevated BNP. Echo was not completed. EKG initially showed diffuse T-wave inversions with a-fib and RBBB, but there was no sign of myocardial injury. Symptoms improved quickly with IV furosemide. Leg edema and pain resolved.     Persistent atrial fibrillation (HCC)  Ongoing problem diagnosed 2017. Rate control management with carvedilol. She was no longer on anticoagulation as she had a GI bleed this year on Xarelto. Recommendation was to start Eliquis, and she is not having any GI upset or bleeding on this. She follows cardiology Dr. Mohsen. Next appointment with him is within the month.     Closed compression fracture of L3 lumbar vertebra (HCC)  Ongoing problem. This was diagnosed last month via MRI showing subacute fracture. She has ongoing significant pain. She did have evaluation with neurosurgery in, but I do not have these records available. Patient cannot tell me what their plan of care is. We have requested these records today and confirmed that follow-up appointment is 9/6    Hypertension  I am not sure what patient is currently taking in regards to dosing of medication. While in the hospital she was transitioned from metoprolol to carvedilol. She is on diltiazem but I'm unsure of what dose. Blood pressure stable today    Normocytic anemia  Has history of normocytic anemia and is due for labs that she is feeling fatigued.       Diagnoses of Hospital discharge follow-up, Acute on chronic diastolic CHF (congestive  heart failure) ([facility]), Persistent atrial fibrillation ([facility]), Essential hypertension, Closed compression fracture of third lumbar vertebra, sequela, and Normocytic anemia were pertinent to this visit.    Allergies: Ace inhibitors  Current medicines (including changes today)  Current Outpatient Prescriptions   Medication Sig Dispense Refill   • carvedilol (COREG) 25 MG Tab Take 25 mg by mouth 2 times a day, with meals.     • apixaban (ELIQUIS) 5mg Tab Take 5 mg by mouth 2 Times a Day.     • potassium chloride SA (KDUR) 20 MEQ Tab CR Take 1 Tab by mouth every day. 90 Tab 3   • furosemide (LASIX) 40 MG Tab Take 1 Tab by mouth every day. 90 Tab 3   • DilTIAZem HCl Coated Beads (DILTIAZEM CD PO) Take  by mouth.     • HYDROcodone-acetaminophen (NORCO) 5-325 MG Tab per tablet Take 1 Tab by mouth every 12 hours as needed (severe pain) for up to 14 days. 28 Tab 0   • ipratropium (ATROVENT) 0.03 % Solution Spray 2 Sprays in nose every 12 hours. 1 Bottle 0   • Multiple Vitamin (MULTI-VITAMINS PO) Take  by mouth.     • vitamin D (CHOLECALCIFEROL) 1000 UNIT TABS Take 1,000 Units by mouth 2 Times a Day.     • azelastine (ASTELIN) 137 MCG/SPRAY nasal spray [place] 1 Spray in nose 2 times a day. 30 mL 0     No current facility-administered medications for this visit.        She  has a past medical history of Allergic rhinitis (9/9/2014); Atrial fibrillation ([facility]) (2017); Basal cell carcinoma; GERD (gastroesophageal reflux disease) (9/9/2014); Hip fracture ([facility]) (2013); Hyperparathyroidism ([facility]); Hypertension (9/9/2014); Recurrent peptic ulcer disease; S/P cholecystectomy (remote); and S/P subtotal gastrectomy (remote). She also has no past medical history of Anxiety; Asthma; or Depression.      ROS  As stated in HPI and additionally  Gen: No F/C, weight increasing  Neuro: No HA or dizziness  CV: +LE edema. No chest pain, rapid heart rate, syncope  Pulm: No cough or sob     Objective:     Blood pressure 118/76, pulse (!) 105,  "temperature 36.5 °C (97.7 °F), height 1.676 m (5' 6\"), weight 59 kg (130 lb), SpO2 92 %. Body mass index is 20.98 kg/m².  Physical Exam:  General: Alert, oriented, in no acute distress.  Eye contact is good, speech goal directed, affect calm  CNs grossly intact.  HEENT: conjunctiva non-injected, sclera non-icteric, EOMs intact. No lid edema or eye drainage.   Gross hearing intact.  Neck: Supple. No adenopathy or masses in the cervical or supraclavicular regions  Lungs: unlabored. clear to auscultation bilaterally with good excursion.  CV: tachy rate and irregular rhythm. No murmurs. No carotid bruits.   Ext: 1+ pitting edema, normal color and temperature.   Skin: No rashes or lesions in visible areas  Gait steady.     Assessment and Plan:   Assessment/Plan:  1. Hospital discharge follow-up  Stable. I have reviewed all hospital records including lab results, imaging studies, EKG, progress notes, and admission and discharge summaries. I have discussed with patient possible factors that contributed to hospitalization and how we can prevent patient from ending up in the hospital again. Educated patient on s/sx to observe for and what action to take when these occur. Reviewed current medications and educated on importance of compliance with these medications all appropriate follow-up visits are scheduled.     2. Acute on chronic diastolic CHF (congestive heart failure) (HCC)  New problem. Still with some edema, but asymptomatic otherwise. Educated patient on goal of <2,000 ml or <70 oz of fluid daily. Elevate legs whenever possible. Encouraged compression stockings. Add potassium to daily furosemide. Monitor weight at the same time daily and keep a log. Follow-up with cardiology. Repeat labs in one week.  - potassium chloride SA (KDUR) 20 MEQ Tab CR; Take 1 Tab by mouth every day.  Dispense: 90 Tab; Refill: 3  - furosemide (LASIX) 40 MG Tab; Take 1 Tab by mouth every day.  Dispense: 90 Tab; Refill: 3  - BASIC METABOLIC " PANEL; Future  - BTYPE NATRIURETIC PEPTIDE; Future    3. Persistent atrial fibrillation (HCC)  Borderline controlled. She will follow-up with cardiology and she will bring all of her medications to that appointment to help them determine what medications need to be increased to allow for better rate control    4. Essential hypertension  Stable on current meds    5. Closed compression fracture of third lumbar vertebra, sequela  Ongoing with severe pain. Currently working with spine specialist but I need to obtain these records to determine what their plan is. I will give a temporary supply of pain medication to hopefully get her through until they come up with treatment options for her  In prescribing controlled substances to this patient, I certify that I have obtained and reviewed the medical history of Zainab Hwang. I have also made a good jase effort to obtain applicable records from other providers who have treated the patient and records did not demonstrate any increased risk of substance abuse that would prevent me from prescribing controlled substances.   I have conducted a physical exam and documented it in recent visits for back diagnosis. I have reviewed Ms. Hwang’s prescription history as maintained by the [place] Prescription Monitoring Program.   I have assessed the patient’s risk for abuse, dependency, and addiction using the validated Opioid Risk Tool available at https://www.mdcalc.com/eeragq-bwnq-kyrw-ort-narcotic-abuse. RISK SCORE 0  Given the above, I believe the benefits of controlled substance therapy outweigh the risks. The reasons for prescribing controlled substances include non-narcotic, oral analgesic alternatives have been inadequate for pain control. Accordingly, I have discussed the risk and benefits, treatment plan, and alternative therapies with the patient.   - HYDROcodone-acetaminophen (NORCO) 5-325 MG Tab per tablet; Take 1 Tab by mouth every 12 hours as needed (severe pain)  for up to 14 days.  Dispense: 28 Tab; Refill: 0  - Consent for Opiate Prescription    6. Normocytic anemia  Check labs  - CBC WITH DIFFERENTIAL; Future       Follow up:  Return in about 4 weeks (around 9/20/2018).    Educated in proper administration of medication(s) ordered today including safety, possible SE, risks, benefits, rationale and alternatives to therapy.   Supportive care, differential diagnoses, and indications for immediate follow-up discussed with patient.    Pathogenesis of diagnosis discussed including typical length and natural progression.    Instructed to return to clinic or nearest emergency department for any change in condition, further concerns, or worsening of symptoms.  Patient states understanding of the plan of care and discharge instructions.      Please note that this dictation was created using voice recognition software. I have made every reasonable attempt to correct obvious errors, but I expect that there are errors of grammar and possibly content that I did not discover before finalizing the note.    Followup: Return in about 4 weeks (around 9/20/2018). sooner should new symptoms or problems arise.

## 2018-08-23 NOTE — ASSESSMENT & PLAN NOTE
Ongoing problem diagnosed 2017. Rate control management with carvedilol. She was no longer on anticoagulation as she had a GI bleed this year on Xarelto. Recommendation was to start Eliquis, and she is not having any GI upset or bleeding on this. She follows cardiology Dr. Mohsen. Next appointment with him is within the month.

## 2018-08-23 NOTE — ASSESSMENT & PLAN NOTE
I am not sure what patient is currently taking in regards to dosing of medication. While in the hospital she was transitioned from metoprolol to carvedilol. She is on diltiazem but I'm unsure of what dose. Blood pressure stable today

## 2018-08-23 NOTE — LETTER
[facility]  UMER GomezRPEDRO  [address]  [facility] [address]  Fax: [phone]   Authorization for Release/Disclosure of   Protected Health Information   Name: ZAINAB HWANG : 7/15/1934 SSN: [id number]   Address: [address] [place] [address] Phone:    [phone] (home)    I authorize the entity listed below to release/disclose the PHI below to:   [facility]/NEVIN Gomez and NEVIN Gomez   Provider or Entity Name:  [facility]    Address   City, State, Zip   Phone:      Fax:     Reason for request: continuity of care   Information to be released:    [  ] LAST COLONOSCOPY,  including any PATH REPORT and follow-up  [  ] LAST FIT/COLOGUARD RESULT [  ] LAST DEXA  [  ] LAST MAMMOGRAM  [  ] LAST PAP  [  ] LAST LABS [  ] RETINA EXAM REPORT  [  ] IMMUNIZATION RECORDS  [ xx ] Release all info      [  ] Check here and initial the line next to each item to release ALL health information INCLUDING  _____ Care and treatment for drug and / or alcohol abuse  _____ HIV testing, infection status, or AIDS  _____ Genetic Testing    DATES OF SERVICE OR TIME PERIOD TO BE DISCLOSED: _____________  I understand and acknowledge that:  * This Authorization may be revoked at any time by you in writing, except if your health information has already been used or disclosed.  * Your health information that will be used or disclosed as a result of you signing this authorization could be re-disclosed by the recipient. If this occurs, your re-disclosed health information may no longer be protected by State or Federal laws.  * You may refuse to sign this Authorization. Your refusal will not affect your ability to obtain treatment.  * This Authorization becomes effective upon signing and will  on (date) __________.      If no date is indicated, this Authorization will  one (1) year from the signature date.    Name: Zainab Hwang    Signature:   Date:     2018          PLEASE FAX REQUESTED RECORDS BACK TO: [phone]

## 2018-08-23 NOTE — LETTER
[facility]  UMER GomezRDajaNDaja  [address]  [place] [address]  Fax: [phone]   Authorization for Release/Disclosure of   Protected Health Information   Name: ZAINAB HWANG : 7/15/1934 SSN: [id number]   Address: [address] [place] [address] Phone:    [phone] (home)    I authorize the entity listed below to release/disclose the PHI below to:   [facility]/NEVIN Gomez and NEVIN Gomez   Provider or Entity Name:   [facility]   City, State, Zip   Phone:      Fax:     Reason for request: continuity of care   Information to be released:    [  ] LAST COLONOSCOPY,  including any PATH REPORT and follow-up  [  ] LAST FIT/COLOGUARD RESULT [  ] LAST DEXA  [  ] LAST MAMMOGRAM  [  ] LAST PAP  [  ] LAST LABS [  ] RETINA EXAM REPORT  [  ] IMMUNIZATION RECORDS  [xx  ] Release all info      [  ] Check here and initial the line next to each item to release ALL health information INCLUDING  _____ Care and treatment for drug and / or alcohol abuse  _____ HIV testing, infection status, or AIDS  _____ Genetic Testing    DATES OF SERVICE OR TIME PERIOD TO BE DISCLOSED: _____________  I understand and acknowledge that:  * This Authorization may be revoked at any time by you in writing, except if your health information has already been used or disclosed.  * Your health information that will be used or disclosed as a result of you signing this authorization could be re-disclosed by the recipient. If this occurs, your re-disclosed health information may no longer be protected by State or Federal laws.  * You may refuse to sign this Authorization. Your refusal will not affect your ability to obtain treatment.  * This Authorization becomes effective upon signing and will  on (date) __________.      If no date is indicated, this Authorization will  one (1) year from the signature date.    Name: Zainab Hwang    Signature:   Date:          8/23/2018       PLEASE FAX REQUESTED RECORDS BACK TO: [phone]

## 2018-09-17 ENCOUNTER — HOSPITAL ENCOUNTER (OUTPATIENT)
Facility: MEDICAL CENTER | Age: 83
End: 2018-09-17
Attending: NURSE PRACTITIONER
Payer: MEDICARE

## 2018-09-17 ENCOUNTER — OFFICE VISIT (OUTPATIENT)
Dept: MEDICAL GROUP | Facility: PHYSICIAN GROUP | Age: 83
End: 2018-09-17
Payer: MEDICARE

## 2018-09-17 VITALS
OXYGEN SATURATION: 97 % | WEIGHT: 126 LBS | BODY MASS INDEX: 20.25 KG/M2 | DIASTOLIC BLOOD PRESSURE: 78 MMHG | TEMPERATURE: 98.1 F | SYSTOLIC BLOOD PRESSURE: 108 MMHG | HEART RATE: 102 BPM | HEIGHT: 66 IN

## 2018-09-17 DIAGNOSIS — I50.33 ACUTE ON CHRONIC DIASTOLIC CHF (CONGESTIVE HEART FAILURE) (HCC): ICD-10-CM

## 2018-09-17 DIAGNOSIS — E21.3 HYPERPARATHYROIDISM (HCC): ICD-10-CM

## 2018-09-17 DIAGNOSIS — D64.9 NORMOCYTIC ANEMIA: ICD-10-CM

## 2018-09-17 DIAGNOSIS — S32.030S CLOSED COMPRESSION FRACTURE OF THIRD LUMBAR VERTEBRA, SEQUELA: ICD-10-CM

## 2018-09-17 DIAGNOSIS — E55.9 VITAMIN D DEFICIENCY: ICD-10-CM

## 2018-09-17 LAB
25(OH)D3 SERPL-MCNC: 43 NG/ML (ref 30–100)
BASOPHILS # BLD AUTO: 0.7 % (ref 0–1.8)
BASOPHILS # BLD: 0.04 K/UL (ref 0–0.12)
BNP SERPL-MCNC: 321 PG/ML (ref 0–100)
EOSINOPHIL # BLD AUTO: 0.06 K/UL (ref 0–0.51)
EOSINOPHIL NFR BLD: 1.1 % (ref 0–6.9)
ERYTHROCYTE [DISTWIDTH] IN BLOOD BY AUTOMATED COUNT: 56.6 FL (ref 35.9–50)
HCT VFR BLD AUTO: 36.5 % (ref 37–47)
HGB BLD-MCNC: 11.5 G/DL (ref 12–16)
IMM GRANULOCYTES # BLD AUTO: 0.04 K/UL (ref 0–0.11)
IMM GRANULOCYTES NFR BLD AUTO: 0.7 % (ref 0–0.9)
LYMPHOCYTES # BLD AUTO: 1.93 K/UL (ref 1–4.8)
LYMPHOCYTES NFR BLD: 35 % (ref 22–41)
MCH RBC QN AUTO: 31.3 PG (ref 27–33)
MCHC RBC AUTO-ENTMCNC: 31.5 G/DL (ref 33.6–35)
MCV RBC AUTO: 99.5 FL (ref 81.4–97.8)
MONOCYTES # BLD AUTO: 1.31 K/UL (ref 0–0.85)
MONOCYTES NFR BLD AUTO: 23.8 % (ref 0–13.4)
NEUTROPHILS # BLD AUTO: 2.13 K/UL (ref 2–7.15)
NEUTROPHILS NFR BLD: 38.7 % (ref 44–72)
NRBC # BLD AUTO: 0 K/UL
NRBC BLD-RTO: 0 /100 WBC
PLATELET # BLD AUTO: 285 K/UL (ref 164–446)
PMV BLD AUTO: 12.1 FL (ref 9–12.9)
PTH-INTACT SERPL-MCNC: 127.3 PG/ML (ref 14–72)
RBC # BLD AUTO: 3.67 M/UL (ref 4.2–5.4)
WBC # BLD AUTO: 5.5 K/UL (ref 4.8–10.8)

## 2018-09-17 PROCEDURE — 83880 ASSAY OF NATRIURETIC PEPTIDE: CPT

## 2018-09-17 PROCEDURE — 82306 VITAMIN D 25 HYDROXY: CPT

## 2018-09-17 PROCEDURE — 99214 OFFICE O/P EST MOD 30 MIN: CPT | Performed by: NURSE PRACTITIONER

## 2018-09-17 PROCEDURE — 83970 ASSAY OF PARATHORMONE: CPT

## 2018-09-17 PROCEDURE — 85025 COMPLETE CBC W/AUTO DIFF WBC: CPT

## 2018-09-17 PROCEDURE — 36415 COLL VENOUS BLD VENIPUNCTURE: CPT

## 2018-09-17 PROCEDURE — 80048 BASIC METABOLIC PNL TOTAL CA: CPT

## 2018-09-17 RX ORDER — CEPHALEXIN 500 MG/1
500 CAPSULE ORAL 4 TIMES DAILY
COMMUNITY
End: 2018-09-17

## 2018-09-17 NOTE — PROGRESS NOTES
Subjective:     Chief Complaint   Patient presents with   • Leg Swelling     both ongoing from hospital fv       HPI  Zainab Hwang is a 84 y.o. female here today for ongoing edema. Here with spouse today     Acute on chronic diastolic CHF (congestive heart failure) (HCC)  Ongoing problem. Still experiencing some LE edema that is not improving.   She started weighing herself daily this past week, but has not brought this log in. States no major fluctuations. Weight down 4 lb from last visit.   We discussed fluid restriction of <2,000 or 70 oz daily. She has not really been following this, but also reports not drinking significant amounts either.   On furosemide 40 mg daily with potassium 20 mEq daily   She did not get the labs done yet that I requested     Closed compression fracture of L3 lumbar vertebra (HCC)  Ongoing problem. Diagnosed via MRI in July as subacute.   She has been going to a Laser doctor at a chiropractor office Dr. Chaudhry. States these are working well, but she is still experiencing some moderate-severe pain.   She did not follow through with [facility] recommendations. Cannot recall their recommendations. I do not have these records available.    Currently using Tylenol during day for pain and had 5 tabs of Norco left over.        Diagnoses of Acute on chronic diastolic CHF (congestive heart failure) (HCC) and Closed compression fracture of third lumbar vertebra, sequela were pertinent to this visit.    Allergies: Ace inhibitors  Current medicines (including changes today)  Current Outpatient Prescriptions   Medication Sig Dispense Refill   • carvedilol (COREG) 25 MG Tab Take 25 mg by mouth 2 times a day, with meals.     • apixaban (ELIQUIS) 5mg Tab Take 5 mg by mouth 2 Times a Day.     • potassium chloride SA (KDUR) 20 MEQ Tab CR Take 1 Tab by mouth every day. 90 Tab 3   • furosemide (LASIX) 40 MG Tab Take 1 Tab by mouth every day. 90 Tab 3   • DilTIAZem HCl Coated Beads (DILTIAZEM  "CD PO) Take  by mouth.     • azelastine (ASTELIN) 137 MCG/SPRAY nasal spray [place] 1 Spray in nose 2 times a day. 30 mL 0   • ipratropium (ATROVENT) 0.03 % Solution Spray 2 Sprays in nose every 12 hours. 1 Bottle 0   • Multiple Vitamin (MULTI-VITAMINS PO) Take  by mouth.     • vitamin D (CHOLECALCIFEROL) 1000 UNIT TABS Take 1,000 Units by mouth 2 Times a Day.       No current facility-administered medications for this visit.        She  has a past medical history of Allergic rhinitis (9/9/2014); Atrial fibrillation ([facility]) (2017); Basal cell carcinoma; CHF (congestive heart failure) ([facility]); GERD (gastroesophageal reflux disease) (9/9/2014); Hip fracture ([facility]) (2013); Hyperparathyroidism ([facility]); Hypertension (9/9/2014); Recurrent peptic ulcer disease; S/P cholecystectomy (remote); and S/P subtotal gastrectomy (remote). She also has no past medical history of Anxiety; Asthma; or Depression.        ROS  As stated in HPI and additionally  Gen; +fatigue. No weight gain  Neuro: No numbness, tingling, weakness of extremities. No HA or dizziness  CV: +LE edema. No palpitations, rapid heart rate, chest pain, or PND  Pulm: No sob or dyspnea      Objective:     Blood pressure 108/78, pulse (!) 102, temperature 36.7 °C (98.1 °F), height 1.676 m (5' 6\"), weight 57.2 kg (126 lb), SpO2 97 %. Body mass index is 20.34 kg/m².  Physical Exam:  General: Alert, oriented, in no acute distress.  Eye contact is good, speech goal directed, affect calm  CNs grossly intact.  HEENT: conjunctiva non-injected, sclera non-icteric, EOMs intact. No lid edema or eye drainage.   Gross hearing intact.  Lungs: unlabored. clear to auscultation bilaterally diminished in BLL  CV: regular rate and irregular rhythm. JVD present   Ext: 2+ pitting edema in feet up to 2/3 of lower legs, normal color and temperature.   Skin: No rashes or lesions in visible areas  Gait steady.     Assessment and Plan:   Assessment/Plan:  1. Acute on chronic diastolic CHF (congestive " heart failure) (HCC)  Ongoing problem. Weight down 4 pounds but still with persistent edema. She will get blood work completed today that I previously ordered. Renal function stable, we will increase furosemide to 40 mg in a.m. and 20 mg in mid afternoon. Continue elevate, monitor fluid intake, and weigh daily     2. Closed compression fracture of third lumbar vertebra, sequela  Currently stable, both ongoing pain. I discussed with her that I need to know the plan of action for treatment with her back from [facility] M review the progress notes from chiropractor. Discussed with patient that guidelines do not recommend chronic pain management with narcotics unless there are no other treatment options available. She will continue to use acetaminophen 1000 mg 2-3 times daily as needed. We will request records today and [facility] and chiropractor. Will discuss back brace        Follow up:  Return in about 3 months (around 12/17/2018).    Educated in proper administration of medication(s) ordered today including safety, possible SE, risks, benefits, rationale and alternatives to therapy.   Supportive care, differential diagnoses, and indications for immediate follow-up discussed with patient.    Pathogenesis of diagnosis discussed including typical length and natural progression.    Instructed to return to clinic or nearest emergency department for any change in condition, further concerns, or worsening of symptoms.  Patient states understanding of the plan of care and discharge instructions.      Please note that this dictation was created using voice recognition software. I have made every reasonable attempt to correct obvious errors, but I expect that there are errors of grammar and possibly content that I did not discover before finalizing the note.    Followup: Return in about 3 months (around 12/17/2018). sooner should new symptoms or problems arise.

## 2018-09-17 NOTE — ASSESSMENT & PLAN NOTE
Ongoing problem. Diagnosed via MRI in July as subacute.   She has been going to a Laser doctor at a chiropractor office Dr. Chaudhry. States these are working well, but she is still experiencing some moderate-severe pain.   She did not follow through with [facility]surgery recommendations. Cannot recall their recommendations. I do not have these records available.    Currently using Tylenol during day for pain and had 5 tabs of Norco left over.

## 2018-09-17 NOTE — ASSESSMENT & PLAN NOTE
Ongoing problem. Still experiencing some LE edema that is not improving.   She started weighing herself daily this past week, but has not brought this log in. States no major fluctuations. Weight down 4 lb from last visit.   We discussed fluid restriction of <2,000 or 70 oz daily. She has not really been following this, but also reports not drinking significant amounts either.   On furosemide 40 mg daily with potassium 20 mEq daily   She did not get the labs done yet that I requested

## 2018-09-17 NOTE — LETTER
[facility]  UMER GomezRDajaNDaja  [address]  [place] [address]  Fax: [phone]   Authorization for Release/Disclosure of   Protected Health Information   Name: ZAINAB HWANG : 7/15/1934 SSN: [id number]   Address: [address] [place] [id number] Phone:    [phone] (home)    I authorize the entity listed below to release/disclose the PHI below to:   [facility]/NEVIN Gomez and NEVIN Gomez   Provider or Entity Name:     Address   City, State, Zip   Phone:      Fax:     Reason for request: continuity of care   Information to be released:    [  ] LAST COLONOSCOPY,  including any PATH REPORT and follow-up  [  ] LAST FIT/COLOGUARD RESULT [  ] LAST DEXA  [  ] LAST MAMMOGRAM  [  ] LAST PAP  [  ] LAST LABS [  ] RETINA EXAM REPORT  [  ] IMMUNIZATION RECORDS  [ xx ] Release all info      [  ] Check here and initial the line next to each item to release ALL health information INCLUDING  _____ Care and treatment for drug and / or alcohol abuse  _____ HIV testing, infection status, or AIDS  _____ Genetic Testing    DATES OF SERVICE OR TIME PERIOD TO BE DISCLOSED: _____________  I understand and acknowledge that:  * This Authorization may be revoked at any time by you in writing, except if your health information has already been used or disclosed.  * Your health information that will be used or disclosed as a result of you signing this authorization could be re-disclosed by the recipient. If this occurs, your re-disclosed health information may no longer be protected by State or Federal laws.  * You may refuse to sign this Authorization. Your refusal will not affect your ability to obtain treatment.  * This Authorization becomes effective upon signing and will  on (date) __________.      If no date is indicated, this Authorization will  one (1) year from the signature date.    Name: Zainab Hwang    Signature:   Date:     2018       PLEASE  FAX REQUESTED RECORDS BACK TO: [phone]

## 2018-09-18 LAB
ANION GAP SERPL CALC-SCNC: 9 MMOL/L (ref 0–11.9)
BUN SERPL-MCNC: 21 MG/DL (ref 8–22)
CALCIUM SERPL-MCNC: 10.1 MG/DL (ref 8.5–10.5)
CHLORIDE SERPL-SCNC: 97 MMOL/L (ref 96–112)
CO2 SERPL-SCNC: 24 MMOL/L (ref 20–33)
CREAT SERPL-MCNC: 1.32 MG/DL (ref 0.5–1.4)
GLUCOSE SERPL-MCNC: 98 MG/DL (ref 65–99)
POTASSIUM SERPL-SCNC: 4.5 MMOL/L (ref 3.6–5.5)
SODIUM SERPL-SCNC: 130 MMOL/L (ref 135–145)

## 2018-09-18 RX ORDER — FUROSEMIDE 20 MG/1
TABLET ORAL
Qty: 5 TAB | Refills: 0 | Status: SHIPPED | OUTPATIENT
Start: 2018-09-18

## 2018-09-28 ENCOUNTER — TELEPHONE (OUTPATIENT)
Dept: MEDICAL GROUP | Facility: PHYSICIAN GROUP | Age: 83
End: 2018-09-28

## 2018-09-28 DIAGNOSIS — S32.030S CLOSED COMPRESSION FRACTURE OF THIRD LUMBAR VERTEBRA, SEQUELA: ICD-10-CM

## 2018-09-28 NOTE — TELEPHONE ENCOUNTER
1. Caller Name: Zainab Hwang                                           Call Back Number: [phone] (home)         Patient approves a detailed voicemail message: N\A    pt brother in law called he stated that Zainab is almost going to be seeing [facility]  they where having a hard time due to referral but they would like to know if you can give a few more pain medications to help with pain until they see [facility]. please advise?

## 2018-10-02 RX ORDER — HYDROCODONE BITARTRATE AND ACETAMINOPHEN 5; 325 MG/1; MG/1
1 TABLET ORAL EVERY 12 HOURS PRN
Qty: 60 TAB | Refills: 0 | Status: SHIPPED | OUTPATIENT
Start: 2018-10-02 | End: 2018-11-01

## 2018-10-02 NOTE — TELEPHONE ENCOUNTER
brother in law stated the girls at [facility] have not been able to make their appointment due to them still working of referrals from sep. 24. He believes a month of pain medication should be more then enough. Please advise?

## 2018-10-02 NOTE — TELEPHONE ENCOUNTER
This got routed to me with no note. Did we get an answer on appointment time or is referral still in process?    STEPHAN Gomez.

## 2018-10-02 NOTE — TELEPHONE ENCOUNTER
Please clarify when her appointment is so I know how to approach the number of pain meds    STEPHAN Gomez.

## 2018-11-06 ENCOUNTER — TELEPHONE (OUTPATIENT)
Dept: MEDICAL GROUP | Facility: PHYSICIAN GROUP | Age: 83
End: 2018-11-06

## 2019-01-17 ENCOUNTER — APPOINTMENT (OUTPATIENT)
Dept: MEDICAL GROUP | Facility: PHYSICIAN GROUP | Age: 84
End: 2019-01-17
Payer: MEDICARE

## 2021-01-15 DIAGNOSIS — Z23 NEED FOR VACCINATION: ICD-10-CM

## 2021-11-23 NOTE — ASSESSMENT & PLAN NOTE
Ongoing problem. Not controlled. Currently with excessive clear nasal drainage and cough from mucus in throat. Drainage is clear. Has been using oral antihistamine and Flonase + Astelin for 1 month. Not improving.      No
